# Patient Record
Sex: FEMALE | Race: WHITE | NOT HISPANIC OR LATINO | Employment: UNEMPLOYED | URBAN - NONMETROPOLITAN AREA
[De-identification: names, ages, dates, MRNs, and addresses within clinical notes are randomized per-mention and may not be internally consistent; named-entity substitution may affect disease eponyms.]

---

## 2023-02-04 PROBLEM — S99.929A FOOT INJURY: Status: ACTIVE | Noted: 2023-02-04

## 2023-02-04 PROBLEM — M19.90 ARTHRITIS: Status: ACTIVE | Noted: 2023-02-04

## 2023-02-04 PROBLEM — R05.9 COUGH: Status: ACTIVE | Noted: 2023-02-04

## 2023-02-04 PROBLEM — L21.9 SEBORRHEIC DERMATITIS: Status: ACTIVE | Noted: 2023-02-04

## 2023-02-04 PROBLEM — E66.812 CLASS 2 OBESITY WITHOUT SERIOUS COMORBIDITY WITH BODY MASS INDEX (BMI) OF 36.0 TO 36.9 IN ADULT: Status: ACTIVE | Noted: 2023-02-04

## 2023-02-04 PROBLEM — R53.83 FATIGUE: Status: ACTIVE | Noted: 2023-02-04

## 2023-02-04 PROBLEM — M54.41 ACUTE RIGHT-SIDED LOW BACK PAIN WITH RIGHT-SIDED SCIATICA: Status: ACTIVE | Noted: 2023-02-04

## 2023-02-04 PROBLEM — N92.1 MENORRHAGIA WITH IRREGULAR CYCLE: Status: ACTIVE | Noted: 2023-02-04

## 2023-02-04 PROBLEM — M54.50 LOW BACK PAIN: Status: ACTIVE | Noted: 2023-02-04

## 2023-02-04 PROBLEM — K64.9 HEMORRHOIDS: Status: ACTIVE | Noted: 2023-02-04

## 2023-02-04 PROBLEM — E66.9 CLASS 2 OBESITY WITHOUT SERIOUS COMORBIDITY WITH BODY MASS INDEX (BMI) OF 36.0 TO 36.9 IN ADULT: Status: ACTIVE | Noted: 2023-02-04

## 2023-02-04 PROBLEM — M25.539 WRIST PAIN, ACUTE: Status: ACTIVE | Noted: 2023-02-04

## 2023-02-04 PROBLEM — R63.2 POLYPHAGIA: Status: ACTIVE | Noted: 2023-02-04

## 2023-02-04 PROBLEM — M54.31 SCIATICA OF RIGHT SIDE: Status: ACTIVE | Noted: 2023-02-04

## 2023-02-04 PROBLEM — R42 DIZZINESS: Status: ACTIVE | Noted: 2023-02-04

## 2023-02-04 PROBLEM — K21.9 CHRONIC GERD: Status: ACTIVE | Noted: 2023-02-04

## 2023-02-04 PROBLEM — R19.7 DIARRHEA: Status: ACTIVE | Noted: 2023-02-04

## 2023-02-04 PROBLEM — R51.9 FREQUENT HEADACHES: Status: ACTIVE | Noted: 2023-02-04

## 2023-02-04 PROBLEM — M65.4 DE QUERVAIN'S TENOSYNOVITIS, BILATERAL: Status: ACTIVE | Noted: 2023-02-04

## 2023-02-04 PROBLEM — M79.641 PAIN OF RIGHT HAND: Status: ACTIVE | Noted: 2023-02-04

## 2023-02-04 PROBLEM — F41.8 ANXIETY WITH DEPRESSION: Status: ACTIVE | Noted: 2023-02-04

## 2023-02-04 PROBLEM — R79.89 LOW VITAMIN D LEVEL: Status: ACTIVE | Noted: 2023-02-04

## 2023-02-04 RX ORDER — SERTRALINE HYDROCHLORIDE 100 MG/1
1 TABLET, FILM COATED ORAL DAILY
COMMUNITY
Start: 2020-10-29 | End: 2023-10-17 | Stop reason: ALTCHOICE

## 2023-02-04 RX ORDER — OMEPRAZOLE 40 MG/1
1 CAPSULE, DELAYED RELEASE ORAL DAILY
COMMUNITY
Start: 2022-09-01 | End: 2023-06-19 | Stop reason: ALTCHOICE

## 2023-02-04 RX ORDER — PRENATAL VIT 49/IRON FUM/FOLIC 6.75-0.2MG
TABLET ORAL
COMMUNITY
End: 2023-06-19 | Stop reason: ALTCHOICE

## 2023-03-23 ENCOUNTER — APPOINTMENT (OUTPATIENT)
Dept: PRIMARY CARE | Facility: CLINIC | Age: 33
End: 2023-03-23
Payer: COMMERCIAL

## 2023-04-04 ENCOUNTER — APPOINTMENT (OUTPATIENT)
Dept: PRIMARY CARE | Facility: CLINIC | Age: 33
End: 2023-04-04
Payer: COMMERCIAL

## 2023-06-19 ENCOUNTER — OFFICE VISIT (OUTPATIENT)
Dept: PRIMARY CARE | Facility: CLINIC | Age: 33
End: 2023-06-19
Payer: COMMERCIAL

## 2023-06-19 VITALS
HEIGHT: 60 IN | HEART RATE: 72 BPM | BODY MASS INDEX: 42.8 KG/M2 | WEIGHT: 218 LBS | SYSTOLIC BLOOD PRESSURE: 124 MMHG | DIASTOLIC BLOOD PRESSURE: 78 MMHG

## 2023-06-19 DIAGNOSIS — M25.532 LEFT WRIST PAIN: Primary | ICD-10-CM

## 2023-06-19 PROCEDURE — 1036F TOBACCO NON-USER: CPT | Performed by: FAMILY MEDICINE

## 2023-06-19 PROCEDURE — 99213 OFFICE O/P EST LOW 20 MIN: CPT | Performed by: FAMILY MEDICINE

## 2023-06-19 RX ORDER — DROSPIRENONE AND ETHINYL ESTRADIOL 0.03MG-3MG
1 KIT ORAL DAILY
COMMUNITY
End: 2024-04-10

## 2023-06-19 RX ORDER — BUSPIRONE HYDROCHLORIDE 10 MG/1
10 TABLET ORAL 2 TIMES DAILY
COMMUNITY

## 2023-06-19 NOTE — PROGRESS NOTES
Subjective   Ethel Su is a 33 y.o. female who presents for No chief complaint on file..  Here c/o left wrist pain - was hit by a baseball a couple of weeks ago, had a bad bruise and the bruise did go away but it is still very painful.  She is able to move it without difficulty, she did have some swelling, that seems better now. Used ice initially, not so much recently.              Objective   Visit Vitals  /78   Pulse 72      Physical Exam  Vitals reviewed.   Constitutional:       General: She is not in acute distress.  Pulmonary:      Effort: Pulmonary effort is normal. No respiratory distress.   Musculoskeletal:         General: Normal range of motion.      Comments: There is a small swelling in what appears to be the soft tissue near the left wrist/base of thumb, it is mildly tender to palpation, no erythema, no other concerning findings.   Skin:     General: Skin is warm and dry.   Neurological:      General: No focal deficit present.      Mental Status: She is alert. Mental status is at baseline.         Assessment/Plan   Problem List Items Addressed This Visit    None  Visit Diagnoses       Left wrist pain    -  Primary    Relevant Orders    XR wrist left 3+ views               Deinse Cano MD

## 2023-06-19 NOTE — PROGRESS NOTES
Subjective   Patient ID: Ethel Su is a 33 y.o. female who presents for left wrist pain. Hit by baseball almost 2 weeks ago. No pain with range of motion but pain to the touch  HPI     Review of Systems    Objective   There were no vitals taken for this visit.    Physical Exam    Assessment/Plan

## 2023-10-17 ENCOUNTER — OFFICE VISIT (OUTPATIENT)
Dept: PRIMARY CARE | Facility: CLINIC | Age: 33
End: 2023-10-17
Payer: COMMERCIAL

## 2023-10-17 VITALS
BODY MASS INDEX: 43.07 KG/M2 | WEIGHT: 219.4 LBS | SYSTOLIC BLOOD PRESSURE: 108 MMHG | HEIGHT: 60 IN | DIASTOLIC BLOOD PRESSURE: 78 MMHG | HEART RATE: 92 BPM | OXYGEN SATURATION: 98 %

## 2023-10-17 DIAGNOSIS — F41.8 ANXIETY WITH DEPRESSION: ICD-10-CM

## 2023-10-17 DIAGNOSIS — K21.9 CHRONIC GERD: ICD-10-CM

## 2023-10-17 DIAGNOSIS — E55.9 VITAMIN D DEFICIENCY: ICD-10-CM

## 2023-10-17 DIAGNOSIS — G43.809 OTHER MIGRAINE WITHOUT STATUS MIGRAINOSUS, NOT INTRACTABLE: ICD-10-CM

## 2023-10-17 DIAGNOSIS — R51.9 FREQUENT HEADACHES: Primary | ICD-10-CM

## 2023-10-17 DIAGNOSIS — R42 DIZZINESS: ICD-10-CM

## 2023-10-17 DIAGNOSIS — E66.01 CLASS 3 SEVERE OBESITY WITHOUT SERIOUS COMORBIDITY WITH BODY MASS INDEX (BMI) OF 40.0 TO 44.9 IN ADULT, UNSPECIFIED OBESITY TYPE (MULTI): ICD-10-CM

## 2023-10-17 PROBLEM — M65.4 DE QUERVAIN'S TENOSYNOVITIS, BILATERAL: Status: RESOLVED | Noted: 2023-02-04 | Resolved: 2023-10-17

## 2023-10-17 PROBLEM — L21.9 SEBORRHEIC DERMATITIS: Status: RESOLVED | Noted: 2023-02-04 | Resolved: 2023-10-17

## 2023-10-17 PROBLEM — M25.539 WRIST PAIN, ACUTE: Status: RESOLVED | Noted: 2023-02-04 | Resolved: 2023-10-17

## 2023-10-17 PROBLEM — S99.929A FOOT INJURY: Status: RESOLVED | Noted: 2023-02-04 | Resolved: 2023-10-17

## 2023-10-17 PROBLEM — R19.7 DIARRHEA: Status: RESOLVED | Noted: 2023-02-04 | Resolved: 2023-10-17

## 2023-10-17 PROBLEM — R05.9 COUGH: Status: RESOLVED | Noted: 2023-02-04 | Resolved: 2023-10-17

## 2023-10-17 PROBLEM — M54.31 SCIATICA OF RIGHT SIDE: Status: RESOLVED | Noted: 2023-02-04 | Resolved: 2023-10-17

## 2023-10-17 PROBLEM — M54.41 ACUTE RIGHT-SIDED LOW BACK PAIN WITH RIGHT-SIDED SCIATICA: Status: RESOLVED | Noted: 2023-02-04 | Resolved: 2023-10-17

## 2023-10-17 PROBLEM — R63.2 POLYPHAGIA: Status: RESOLVED | Noted: 2023-02-04 | Resolved: 2023-10-17

## 2023-10-17 PROBLEM — K64.9 HEMORRHOIDS: Status: RESOLVED | Noted: 2023-02-04 | Resolved: 2023-10-17

## 2023-10-17 PROBLEM — M54.50 LOW BACK PAIN: Status: RESOLVED | Noted: 2023-02-04 | Resolved: 2023-10-17

## 2023-10-17 PROBLEM — M79.641 PAIN OF RIGHT HAND: Status: RESOLVED | Noted: 2023-02-04 | Resolved: 2023-10-17

## 2023-10-17 PROBLEM — E66.813 CLASS 3 SEVERE OBESITY WITHOUT SERIOUS COMORBIDITY WITH BODY MASS INDEX (BMI) OF 40.0 TO 44.9 IN ADULT: Status: ACTIVE | Noted: 2023-02-04

## 2023-10-17 PROCEDURE — 3008F BODY MASS INDEX DOCD: CPT | Performed by: FAMILY MEDICINE

## 2023-10-17 PROCEDURE — 1036F TOBACCO NON-USER: CPT | Performed by: FAMILY MEDICINE

## 2023-10-17 PROCEDURE — 99214 OFFICE O/P EST MOD 30 MIN: CPT | Performed by: FAMILY MEDICINE

## 2023-10-17 RX ORDER — SUMATRIPTAN SUCCINATE 100 MG/1
100 TABLET ORAL ONCE AS NEEDED
Qty: 10 TABLET | Refills: 3 | Status: SHIPPED | OUTPATIENT
Start: 2023-10-17 | End: 2023-11-28 | Stop reason: SINTOL

## 2023-10-17 RX ORDER — VENLAFAXINE HYDROCHLORIDE 75 MG/1
75 CAPSULE, EXTENDED RELEASE ORAL EVERY MORNING
COMMUNITY
Start: 2023-09-28

## 2023-10-17 RX ORDER — CALCITRIOL 0.5 UG/1
0.5 CAPSULE ORAL DAILY
COMMUNITY
End: 2024-04-15 | Stop reason: WASHOUT

## 2023-10-17 NOTE — PROGRESS NOTES
Subjective   Patient ID: Ethel Su is a 33 y.o. female who presents for migraine headaches x3 weeks some lasting for  3 days.    HPI     Review of Systems    Objective   There were no vitals taken for this visit.    Physical Exam    Assessment/Plan

## 2023-10-17 NOTE — PROGRESS NOTES
Subjective   Ethel Su is a 33 y.o. female who presents for No chief complaint on file..  Here c/o increased issues with migraine headaches for the past 3 weeks.  She has had some nausea, some dizziness/heart racing when standing - that has been about 6 months.   She is having some issues with her periods for about a year - is seeing GYN for that.  She states that she does have an appointment with her eye doctor as well.      She did switch from sertraline to Effexor in August.              Objective   Visit Vitals  /78 (BP Location: Left arm, Patient Position: Sitting, BP Cuff Size: Adult)   Pulse 92      Physical Exam  Vitals reviewed.   Constitutional:       General: She is not in acute distress.  Cardiovascular:      Rate and Rhythm: Normal rate and regular rhythm.      Heart sounds: No murmur heard.  Pulmonary:      Effort: Pulmonary effort is normal. No respiratory distress.      Breath sounds: Normal breath sounds.   Skin:     General: Skin is warm and dry.   Neurological:      General: No focal deficit present.      Mental Status: She is alert. Mental status is at baseline.         Assessment/Plan   Problem List Items Addressed This Visit       Anxiety with depression    Relevant Orders    CBC and Auto Differential    Comprehensive Metabolic Panel    Lipid Panel    TSH with reflex to Free T4 if abnormal    Vitamin B12    Vitamin D 25-Hydroxy,Total (for eval of Vitamin D levels)    Magnesium    Chronic GERD    Relevant Orders    CBC and Auto Differential    Comprehensive Metabolic Panel    Lipid Panel    TSH with reflex to Free T4 if abnormal    Vitamin B12    Vitamin D 25-Hydroxy,Total (for eval of Vitamin D levels)    Magnesium    Dizziness    Relevant Orders    CBC and Auto Differential    Comprehensive Metabolic Panel    Lipid Panel    TSH with reflex to Free T4 if abnormal    Vitamin B12    Vitamin D 25-Hydroxy,Total (for eval of Vitamin D levels)    Magnesium    Frequent headaches - Primary     Relevant Orders    CBC and Auto Differential    Comprehensive Metabolic Panel    Lipid Panel    TSH with reflex to Free T4 if abnormal    Vitamin B12    Vitamin D 25-Hydroxy,Total (for eval of Vitamin D levels)    Magnesium    Class 3 severe obesity without serious comorbidity with body mass index (BMI) of 40.0 to 44.9 in adult (CMS/[facility])    Relevant Orders    CBC and Auto Differential    Comprehensive Metabolic Panel    Lipid Panel    TSH with reflex to Free T4 if abnormal    Vitamin B12    Vitamin D 25-Hydroxy,Total (for eval of Vitamin D levels)    Magnesium     Other Visit Diagnoses       Vitamin D deficiency        Relevant Orders    Vitamin D 25-Hydroxy,Total (for eval of Vitamin D levels)    Other migraine without status migrainosus, not intractable        Relevant Medications    SUMAtriptan (Imitrex) 100 mg tablet               Denise Cano MD

## 2023-10-19 ENCOUNTER — LAB (OUTPATIENT)
Dept: LAB | Facility: LAB | Age: 33
End: 2023-10-19
Payer: COMMERCIAL

## 2023-10-19 DIAGNOSIS — E55.9 VITAMIN D DEFICIENCY: ICD-10-CM

## 2023-10-19 DIAGNOSIS — R42 DIZZINESS: ICD-10-CM

## 2023-10-19 DIAGNOSIS — K21.9 CHRONIC GERD: ICD-10-CM

## 2023-10-19 DIAGNOSIS — R51.9 FREQUENT HEADACHES: ICD-10-CM

## 2023-10-19 DIAGNOSIS — E66.01 CLASS 3 SEVERE OBESITY WITHOUT SERIOUS COMORBIDITY WITH BODY MASS INDEX (BMI) OF 40.0 TO 44.9 IN ADULT, UNSPECIFIED OBESITY TYPE (MULTI): ICD-10-CM

## 2023-10-19 DIAGNOSIS — F41.8 ANXIETY WITH DEPRESSION: ICD-10-CM

## 2023-10-19 LAB
25(OH)D3 SERPL-MCNC: 25 NG/ML (ref 30–100)
ALBUMIN SERPL BCP-MCNC: 4.3 G/DL (ref 3.4–5)
ALP SERPL-CCNC: 79 U/L (ref 33–110)
ALT SERPL W P-5'-P-CCNC: 19 U/L (ref 7–45)
ANION GAP SERPL CALC-SCNC: 14 MMOL/L (ref 10–20)
AST SERPL W P-5'-P-CCNC: 15 U/L (ref 9–39)
BASOPHILS # BLD AUTO: 0.03 X10*3/UL (ref 0–0.1)
BASOPHILS NFR BLD AUTO: 0.4 %
BILIRUB SERPL-MCNC: 0.4 MG/DL (ref 0–1.2)
BUN SERPL-MCNC: 9 MG/DL (ref 6–23)
CALCIUM SERPL-MCNC: 9.4 MG/DL (ref 8.6–10.3)
CHLORIDE SERPL-SCNC: 102 MMOL/L (ref 98–107)
CHOLEST SERPL-MCNC: 216 MG/DL (ref 0–199)
CHOLESTEROL/HDL RATIO: 4
CO2 SERPL-SCNC: 25 MMOL/L (ref 21–32)
CREAT SERPL-MCNC: 0.52 MG/DL (ref 0.5–1.05)
EOSINOPHIL # BLD AUTO: 0.09 X10*3/UL (ref 0–0.7)
EOSINOPHIL NFR BLD AUTO: 1.3 %
ERYTHROCYTE [DISTWIDTH] IN BLOOD BY AUTOMATED COUNT: 13.1 % (ref 11.5–14.5)
GFR SERPL CREATININE-BSD FRML MDRD: >90 ML/MIN/1.73M*2
GLUCOSE SERPL-MCNC: 89 MG/DL (ref 74–99)
HCT VFR BLD AUTO: 42.5 % (ref 36–46)
HDLC SERPL-MCNC: 54 MG/DL
HGB BLD-MCNC: 13.3 G/DL (ref 12–16)
IMM GRANULOCYTES # BLD AUTO: 0.02 X10*3/UL (ref 0–0.7)
IMM GRANULOCYTES NFR BLD AUTO: 0.3 % (ref 0–0.9)
LDLC SERPL CALC-MCNC: 123 MG/DL
LYMPHOCYTES # BLD AUTO: 2.3 X10*3/UL (ref 1.2–4.8)
LYMPHOCYTES NFR BLD AUTO: 33.9 %
MAGNESIUM SERPL-MCNC: 1.98 MG/DL (ref 1.6–2.4)
MCH RBC QN AUTO: 27.5 PG (ref 26–34)
MCHC RBC AUTO-ENTMCNC: 31.3 G/DL (ref 32–36)
MCV RBC AUTO: 88 FL (ref 80–100)
MONOCYTES # BLD AUTO: 0.29 X10*3/UL (ref 0.1–1)
MONOCYTES NFR BLD AUTO: 4.3 %
NEUTROPHILS # BLD AUTO: 4.05 X10*3/UL (ref 1.2–7.7)
NEUTROPHILS NFR BLD AUTO: 59.8 %
NON HDL CHOLESTEROL: 162 MG/DL (ref 0–149)
NRBC BLD-RTO: 0 /100 WBCS (ref 0–0)
PLATELET # BLD AUTO: 407 X10*3/UL (ref 150–450)
PMV BLD AUTO: 9.9 FL (ref 7.5–11.5)
POTASSIUM SERPL-SCNC: 4.3 MMOL/L (ref 3.5–5.3)
PROT SERPL-MCNC: 7.6 G/DL (ref 6.4–8.2)
RBC # BLD AUTO: 4.84 X10*6/UL (ref 4–5.2)
SODIUM SERPL-SCNC: 137 MMOL/L (ref 136–145)
TRIGL SERPL-MCNC: 195 MG/DL (ref 0–149)
TSH SERPL-ACNC: 1.88 MIU/L (ref 0.44–3.98)
VIT B12 SERPL-MCNC: 163 PG/ML (ref 211–911)
VLDL: 39 MG/DL (ref 0–40)
WBC # BLD AUTO: 6.8 X10*3/UL (ref 4.4–11.3)

## 2023-10-19 PROCEDURE — 80061 LIPID PANEL: CPT

## 2023-10-19 PROCEDURE — 36415 COLL VENOUS BLD VENIPUNCTURE: CPT

## 2023-10-19 PROCEDURE — 83735 ASSAY OF MAGNESIUM: CPT

## 2023-10-19 PROCEDURE — 82607 VITAMIN B-12: CPT

## 2023-10-19 PROCEDURE — 84443 ASSAY THYROID STIM HORMONE: CPT

## 2023-10-19 PROCEDURE — 85025 COMPLETE CBC W/AUTO DIFF WBC: CPT

## 2023-10-19 PROCEDURE — 80053 COMPREHEN METABOLIC PANEL: CPT

## 2023-10-19 PROCEDURE — 82306 VITAMIN D 25 HYDROXY: CPT

## 2023-10-24 ENCOUNTER — OFFICE VISIT (OUTPATIENT)
Dept: OBSTETRICS AND GYNECOLOGY | Facility: CLINIC | Age: 33
End: 2023-10-24
Payer: COMMERCIAL

## 2023-10-24 ENCOUNTER — OFFICE VISIT (OUTPATIENT)
Dept: PRIMARY CARE | Facility: CLINIC | Age: 33
End: 2023-10-24
Payer: COMMERCIAL

## 2023-10-24 VITALS
HEART RATE: 72 BPM | OXYGEN SATURATION: 99 % | HEIGHT: 63 IN | SYSTOLIC BLOOD PRESSURE: 100 MMHG | BODY MASS INDEX: 38.7 KG/M2 | DIASTOLIC BLOOD PRESSURE: 66 MMHG | WEIGHT: 218.4 LBS

## 2023-10-24 VITALS
WEIGHT: 219.6 LBS | HEIGHT: 63 IN | DIASTOLIC BLOOD PRESSURE: 74 MMHG | BODY MASS INDEX: 38.91 KG/M2 | SYSTOLIC BLOOD PRESSURE: 120 MMHG

## 2023-10-24 DIAGNOSIS — R42 DIZZINESS: ICD-10-CM

## 2023-10-24 DIAGNOSIS — Z30.41 ENCOUNTER FOR SURVEILLANCE OF CONTRACEPTIVE PILLS: Primary | ICD-10-CM

## 2023-10-24 DIAGNOSIS — H53.40 VISUAL FIELD DEFECT: Primary | ICD-10-CM

## 2023-10-24 DIAGNOSIS — R51.9 FREQUENT HEADACHES: ICD-10-CM

## 2023-10-24 PROCEDURE — 99213 OFFICE O/P EST LOW 20 MIN: CPT | Performed by: FAMILY MEDICINE

## 2023-10-24 PROCEDURE — 1036F TOBACCO NON-USER: CPT | Performed by: OBSTETRICS & GYNECOLOGY

## 2023-10-24 PROCEDURE — 1036F TOBACCO NON-USER: CPT | Performed by: FAMILY MEDICINE

## 2023-10-24 PROCEDURE — 3008F BODY MASS INDEX DOCD: CPT | Performed by: FAMILY MEDICINE

## 2023-10-24 PROCEDURE — 99213 OFFICE O/P EST LOW 20 MIN: CPT | Performed by: OBSTETRICS & GYNECOLOGY

## 2023-10-24 PROCEDURE — 3008F BODY MASS INDEX DOCD: CPT | Performed by: OBSTETRICS & GYNECOLOGY

## 2023-10-24 RX ORDER — BUTALB/ACETAMINOPHEN/CAFFEINE 50-325-40
1 TABLET ORAL DAILY
COMMUNITY

## 2023-10-24 NOTE — PROGRESS NOTES
Subjective   Patient ID: Ethel Su is a 33 y.o. female who presents for 1 week follow up for labs and also medication recheck. Imitrex  isn't working for her. Seen eye doctor after her appointment last week and the doctor was stating that her vision issue isn't related to her eyes that it is more then likely a medical issue. Patient states that they state to see a Optometrists     HPI     Review of Systems    Objective   LMP 09/26/2023 Comment: Hx of tubal sterilization    Physical Exam    Assessment/Plan

## 2023-10-24 NOTE — PROGRESS NOTES
"Subjective   Patient ID: Ethel Su is a 33 y.o. female who presents for Vaginal Bleeding (Patient is here due to irregular bleeding with her birth control. Patient states she will start bleeding 1 week prior to placebo pills and will bleed for 2 weeks. Patient was started on birth control in May due to irregular periods.).    HPI  Patient was started on birth control pills in May 2023 due to irregular vaginal bleeding.  Patient states that she would have approximately 2 weeks worth of bleeding with each cycle up until September.  For the month of October with the current pill pack, she has had no breakthrough bleeding issues.  Denies any changes in activity or diet.  Denies any changes in medications.  Previous tubal sterilization.    Review of Systems  Review of Systems:   Constitutional: No fever or chills  Respiratory: No shortness of breath, or cough  Cardiovascular: No chest pain or syncope  Breasts: No breast pain, no masses, no nipple discharge  Gastrointestinal: No nausea, vomiting, or diarrhea, no abdominal pain  Genitourinary: No dysuria or frequency  Gynecology: Negative except as noted in history of present illness  All other: All other systems reviewed and negative for complaint    Objective   /74   Ht 1.6 m (5' 3\")   Wt 99.6 kg (219 lb 9.6 oz)   LMP 09/26/2023 Comment: Hx of tubal sterilization  BMI 38.90 kg/m²      Physical Exam  General: No acute distress  Eye: Intraocular movements are intact  HEENT: Normocephalic  Respiratory: Respirations are nonlabored  Gastrointestinal: Nondistended   Musculoskeletal: Normal range of motion  Neurologic: Alert and oriented x3  Psychiatric: Cooperative, appropriate mood and affect.    Assessment/Plan   Problem List Items Addressed This Visit    None  Visit Diagnoses       Encounter for surveillance of contraceptive pills    -  Primary         1.  Contraceptive counseling  Reviewed the episodes of irregular vaginal bleeding since being on the birth " control pills.  She has had no breakthrough bleeding issues on the current pill pack.  Most likely her cycles have now regulated on the birth control pills.  Recommend no changes at this time.  If she has any subsequent bleeding issues she is encouraged to call for further evaluation.  Follow-up in May for her annual exam.

## 2023-10-24 NOTE — PATIENT INSTRUCTIONS
She will get an otc vitamin B12 supplement, resume vitamin D supplement as well.  Will get MRI of the brain and refer to ophthalmology.  Follow up after MRI.

## 2023-10-24 NOTE — PROGRESS NOTES
Subjective   Ethel Su is a 33 y.o. female who presents for No chief complaint on file..  Here for follow up headaches.  She states that the imitrex has not been helpful.  She states that she has been trying to take her medications regularly and that has been somewhat helpful.  She states that she saw the eye doctor last week - has had a visual defect in the right eye.  States that it is pretty much all the time.               Objective   Visit Vitals  /66 (BP Location: Left arm, Patient Position: Sitting, BP Cuff Size: Adult)   Pulse 72      Physical Exam  Vitals reviewed.   Constitutional:       General: She is not in acute distress.  Cardiovascular:      Rate and Rhythm: Normal rate and regular rhythm.      Heart sounds: No murmur heard.  Pulmonary:      Effort: Pulmonary effort is normal. No respiratory distress.      Breath sounds: Normal breath sounds.   Skin:     General: Skin is warm and dry.   Neurological:      General: No focal deficit present.      Mental Status: She is alert. Mental status is at baseline.        Latest Reference Range & Units 10/19/23 10:12   GLUCOSE 74 - 99 mg/dL 89   SODIUM 136 - 145 mmol/L 137   POTASSIUM 3.5 - 5.3 mmol/L 4.3   CHLORIDE 98 - 107 mmol/L 102   Bicarbonate 21 - 32 mmol/L 25   Anion Gap 10 - 20 mmol/L 14   Blood Urea Nitrogen 6 - 23 mg/dL 9   Creatinine 0.50 - 1.05 mg/dL 0.52   EGFR >60 mL/min/1.73m*2 >90   Calcium 8.6 - 10.3 mg/dL 9.4   Albumin 3.4 - 5.0 g/dL 4.3   Alkaline Phosphatase 33 - 110 U/L 79   ALT 7 - 45 U/L 19   AST 9 - 39 U/L 15   Bilirubin Total 0.0 - 1.2 mg/dL 0.4   HDL CHOLESTEROL mg/dL 54.0   Cholesterol/HDL Ratio  4.0   LDL Calculated <=99 mg/dL 123 (H)   VLDL 0 - 40 mg/dL 39   TRIGLYCERIDES 0 - 149 mg/dL 195 (H)   Non HDL Cholesterol 0 - 149 mg/dL 162 (H)   Total Protein 6.4 - 8.2 g/dL 7.6   MAGNESIUM 1.60 - 2.40 mg/dL 1.98   CHOLESTEROL 0 - 199 mg/dL 216 (H)   Vitamin B12 211 - 911 pg/mL 163 (L)   Thyroid Stimulating Hormone 0.44 - 3.98  mIU/L 1.88   Vitamin D, 25-Hydroxy, Total 30 - 100 ng/mL 25 (L)   WBC 4.4 - 11.3 x10*3/uL 6.8   nRBC 0.0 - 0.0 /100 WBCs 0.0   RBC 4.00 - 5.20 x10*6/uL 4.84   HEMOGLOBIN 12.0 - 16.0 g/dL 13.3   HEMATOCRIT 36.0 - 46.0 % 42.5   MCV 80 - 100 fL 88   MCH 26.0 - 34.0 pg 27.5   MCHC 32.0 - 36.0 g/dL 31.3 (L)   RED CELL DISTRIBUTION WIDTH 11.5 - 14.5 % 13.1   Platelets 150 - 450 x10*3/uL 407   MEAN PLATELET VOLUME 7.5 - 11.5 fL 9.9   Neutrophils % 40.0 - 80.0 % 59.8   Immature Granulocytes %, Automated 0.0 - 0.9 % 0.3   Lymphocytes % 13.0 - 44.0 % 33.9   Monocytes % 2.0 - 10.0 % 4.3   Eosinophils % 0.0 - 6.0 % 1.3   Basophils % 0.0 - 2.0 % 0.4   Neutrophils Absolute 1.20 - 7.70 x10*3/uL 4.05   Immature Granulocytes Absolute, Automated 0.00 - 0.70 x10*3/uL 0.02   Lymphocytes Absolute 1.20 - 4.80 x10*3/uL 2.30   Monocytes Absolute 0.10 - 1.00 x10*3/uL 0.29   Eosinophils Absolute 0.00 - 0.70 x10*3/uL 0.09   Basophils Absolute 0.00 - 0.10 x10*3/uL 0.03   (H): Data is abnormally high  (L): Data is abnormally low    Assessment/Plan   Problem List Items Addressed This Visit       Dizziness    Relevant Orders    MR brain w and wo IV contrast    Frequent headaches    Relevant Orders    MR brain w and wo IV contrast     Other Visit Diagnoses       Visual field defect    -  Primary    Relevant Orders    Referral to Ophthalmology    MR brain w and wo IV contrast               Denise Cano MD

## 2023-10-31 ENCOUNTER — HOSPITAL ENCOUNTER (OUTPATIENT)
Dept: RADIOLOGY | Facility: HOSPITAL | Age: 33
Discharge: HOME | End: 2023-10-31
Payer: COMMERCIAL

## 2023-10-31 DIAGNOSIS — R42 DIZZINESS: ICD-10-CM

## 2023-10-31 DIAGNOSIS — R51.9 FREQUENT HEADACHES: ICD-10-CM

## 2023-10-31 DIAGNOSIS — H53.40 VISUAL FIELD DEFECT: ICD-10-CM

## 2023-10-31 PROCEDURE — 70553 MRI BRAIN STEM W/O & W/DYE: CPT | Performed by: RADIOLOGY

## 2023-10-31 PROCEDURE — A9575 INJ GADOTERATE MEGLUMI 0.1ML: HCPCS | Performed by: FAMILY MEDICINE

## 2023-10-31 PROCEDURE — 70553 MRI BRAIN STEM W/O & W/DYE: CPT

## 2023-10-31 PROCEDURE — 2550000001 HC RX 255 CONTRASTS: Performed by: FAMILY MEDICINE

## 2023-10-31 RX ORDER — GADOTERATE MEGLUMINE 376.9 MG/ML
20 INJECTION INTRAVENOUS
Status: COMPLETED | OUTPATIENT
Start: 2023-10-31 | End: 2023-10-31

## 2023-10-31 RX ADMIN — GADOTERATE MEGLUMINE 20 ML: 376.9 INJECTION INTRAVENOUS at 20:26

## 2023-11-07 ENCOUNTER — APPOINTMENT (OUTPATIENT)
Dept: RADIOLOGY | Facility: HOSPITAL | Age: 33
End: 2023-11-07
Payer: COMMERCIAL

## 2023-11-28 ENCOUNTER — OFFICE VISIT (OUTPATIENT)
Dept: PRIMARY CARE | Facility: CLINIC | Age: 33
End: 2023-11-28
Payer: COMMERCIAL

## 2023-11-28 VITALS
SYSTOLIC BLOOD PRESSURE: 128 MMHG | OXYGEN SATURATION: 93 % | WEIGHT: 216.1 LBS | HEIGHT: 60 IN | DIASTOLIC BLOOD PRESSURE: 88 MMHG | BODY MASS INDEX: 42.43 KG/M2 | HEART RATE: 86 BPM

## 2023-11-28 DIAGNOSIS — R51.9 FREQUENT HEADACHES: Primary | ICD-10-CM

## 2023-11-28 PROCEDURE — 1036F TOBACCO NON-USER: CPT | Performed by: FAMILY MEDICINE

## 2023-11-28 PROCEDURE — 3008F BODY MASS INDEX DOCD: CPT | Performed by: FAMILY MEDICINE

## 2023-11-28 PROCEDURE — 99213 OFFICE O/P EST LOW 20 MIN: CPT | Performed by: FAMILY MEDICINE

## 2023-11-28 RX ORDER — METOPROLOL SUCCINATE 25 MG/1
25 TABLET, EXTENDED RELEASE ORAL DAILY
Qty: 30 TABLET | Refills: 5 | Status: SHIPPED | OUTPATIENT
Start: 2023-11-28 | End: 2024-05-26

## 2023-11-28 NOTE — PROGRESS NOTES
Subjective   Patient ID: Ethel Su is a 33 y.o. female who presents for follow up for MRI results.     HPI     Review of Systems    Objective   There were no vitals taken for this visit.    Physical Exam    Assessment/Plan

## 2023-12-16 ENCOUNTER — OFFICE VISIT (OUTPATIENT)
Dept: URGENT CARE | Facility: CLINIC | Age: 33
End: 2023-12-16
Payer: COMMERCIAL

## 2023-12-16 VITALS
BODY MASS INDEX: 42.21 KG/M2 | DIASTOLIC BLOOD PRESSURE: 95 MMHG | RESPIRATION RATE: 16 BRPM | OXYGEN SATURATION: 97 % | HEIGHT: 60 IN | HEART RATE: 77 BPM | WEIGHT: 215 LBS | SYSTOLIC BLOOD PRESSURE: 137 MMHG | TEMPERATURE: 96 F

## 2023-12-16 DIAGNOSIS — J06.9 URI WITH COUGH AND CONGESTION: Primary | ICD-10-CM

## 2023-12-16 PROCEDURE — 99212 OFFICE O/P EST SF 10 MIN: CPT | Mod: 25 | Performed by: PHYSICIAN ASSISTANT

## 2023-12-16 RX ORDER — AMOXICILLIN 500 MG/1
500 CAPSULE ORAL 2 TIMES DAILY
Qty: 14 CAPSULE | Refills: 0 | Status: SHIPPED | OUTPATIENT
Start: 2023-12-16 | End: 2023-12-23

## 2023-12-16 RX ORDER — AZELASTINE 1 MG/ML
2 SPRAY, METERED NASAL 2 TIMES DAILY
Qty: 30 ML | Refills: 0 | Status: SHIPPED | OUTPATIENT
Start: 2023-12-16 | End: 2024-04-17 | Stop reason: WASHOUT

## 2023-12-16 RX ORDER — BROMPHENIRAMINE MALEATE, PSEUDOEPHEDRINE HYDROCHLORIDE, AND DEXTROMETHORPHAN HYDROBROMIDE 2; 30; 10 MG/5ML; MG/5ML; MG/5ML
5 SYRUP ORAL 4 TIMES DAILY PRN
Qty: 120 ML | Refills: 0 | Status: SHIPPED | OUTPATIENT
Start: 2023-12-16 | End: 2023-12-26

## 2023-12-16 ASSESSMENT — VISUAL ACUITY: OU: 1

## 2023-12-16 NOTE — PROGRESS NOTES
[facility] URGENT CARE ALEM NOTE:      Name: Ethel Su, 33 y.o.    CSN:[id number]   MRN:[id number]    PCP: Denise Cano MD    ALL:  No Known Allergies    History:    Chief Complaint: URI (COUGH, LOSS OF VOICE, CONGESTION, HEADACHE, FATIGUED X 1 WEEK)    Encounter Date: 2023  12:23hrs    HPI: The history was obtained from the patient. Ethel is a 33 y.o. female, who presents with a chief complaint of URI (COUGH, LOSS OF VOICE, CONGESTION, HEADACHE, FATIGUED X 1 WEEK)    Has been exhibiting symptoms as described above for a week, she has been taking DayQuil and NyQuil with modest improvement, denies any overall resolution of symptoms, she mentions her children have also exhibited similar symptoms but seem to be improving.  She denies any exertional dyspnea, denies any nausea, vomiting or diarrhea.    PMHx:    Past Medical History:   Diagnosis Date    Anxiety     Arthritis     COVID-19 2021    COVID    Depression     Encounter for  delivery without indication     Delivery of pregnancy by  section    Encounter for routine postpartum follow-up 2022    Postpartum exam    Encounter for routine postpartum follow-up 2021    Postpartum care following  delivery    Gestational diabetes mellitus in pregnancy, diet controlled 2020    Diet controlled gestational diabetes mellitus (GDM) in third trimester    Migraine, unspecified, not intractable, without status migrainosus 2018    Migraine    Other conditions influencing health status     Menstruation    Other specified pregnancy related conditions, unspecified trimester 2022    Rh negative, antepartum    Pain in right wrist 2021    Right wrist pain              Current Outpatient Medications   Medication Sig Dispense Refill    busPIRone (Buspar) 10 mg tablet Take 1 tablet (10 mg) by mouth 2 times a day.      calcium citrate-vitamin D3 (Citracal+D) 315 mg-5 mcg (200 unit) tablet Take  1 tablet by mouth once daily.      drospirenone-ethinyl estradioL (Eboni, Ocella) 3-0.03 mg tablet Take 1 tablet by mouth once daily. as directed      metoprolol succinate XL (Toprol-XL) 25 mg 24 hr tablet Take 1 tablet (25 mg) by mouth once daily. Do not crush or chew. 30 tablet 5    multivit-min/ferrous fumarate (MULTI VITAMIN ORAL) Take 1 capsule by mouth once daily.      venlafaxine XR (Effexor-XR) 75 mg 24 hr capsule Take 1 capsule (75 mg) by mouth once daily in the morning.      amoxicillin (Amoxil) 500 mg capsule Take 1 capsule (500 mg) by mouth 2 times a day for 7 days. 14 capsule 0    azelastine (Astelin) 137 mcg (0.1 %) nasal spray Administer 2 sprays into each nostril 2 times a day for 15 days. Use in each nostril as directed 30 mL 0    brompheniramine-pseudoeph-DM 2-30-10 mg/5 mL syrup Take 5 mL by mouth 4 times a day as needed for allergies for up to 10 days. 120 mL 0    calcitriol (Rocaltrol) 0.5 mcg capsule Take 1 capsule (0.5 mcg) by mouth once daily.       No current facility-administered medications for this visit.         PMSx:    Past Surgical History:   Procedure Laterality Date     SECTION, LOW TRANSVERSE  2020    CHOLECYSTECTOMY  2018    TONSILLECTOMY      TUBAL LIGATION  2022       Fam Hx:   Family History   Problem Relation Name Age of Onset    COPD Mother Roxann     Hyperlipidemia Father Ethan     Other (CVA) Maternal Grandmother Dortha     Diabetes Maternal Grandmother Dortha     Hypertension Maternal Grandmother Dortha     Stroke Maternal Grandmother Dortha     Other (non-hodgkins lymphoma) Paternal Grandmother Bubba     Cancer Paternal Grandmother Bubba     Heart attack Paternal Grandfather Ottoniel     Alcohol abuse Paternal Grandfather Ottoniel        SOC. Hx:     Social History     Socioeconomic History    Marital status:      Spouse name: Not on file    Number of children: Not on file    Years of education: Not on file    Highest education level: Not on file    Occupational History    Not on file   Tobacco Use    Smoking status: Never    Smokeless tobacco: Never   Vaping Use    Vaping Use: Never used   Substance and Sexual Activity    Alcohol use: Never    Drug use: Never    Sexual activity: Yes     Partners: Male     Birth control/protection: Female Sterilization   Other Topics Concern    Not on file   Social History Narrative    Not on file     Social Determinants of Health     Financial Resource Strain: Not on file   Food Insecurity: Not on file   Transportation Needs: Not on file   Physical Activity: Not on file   Stress: Not on file   Social Connections: Not on file   Intimate Partner Violence: Not on file   Housing Stability: Not on file         Vitals:    12/16/23 1205   BP: (!) 137/95   Pulse: 77   Resp: 16   Temp: 35.6 °C (96 °F)   SpO2: 97%     97.5 kg (215 lb)          Physical Exam  Constitutional:       Appearance: Normal appearance. She is normal weight.   HENT:      Head: Normocephalic and atraumatic.      Nose: Congestion and rhinorrhea present.      Right Turbinates: Enlarged and swollen.      Left Turbinates: Enlarged and swollen.      Right Sinus: Maxillary sinus tenderness and frontal sinus tenderness present.      Left Sinus: Maxillary sinus tenderness and frontal sinus tenderness present.      Mouth/Throat:      Lips: Pink.      Mouth: Mucous membranes are moist.      Comments: Noted posterior nasal drainage  Eyes:      General: Lids are normal. Vision grossly intact.      Extraocular Movements: Extraocular movements intact.      Comments: Wearing glasses   Cardiovascular:      Rate and Rhythm: Normal rate and regular rhythm.   Pulmonary:      Effort: Pulmonary effort is normal.      Breath sounds: Normal breath sounds.   Musculoskeletal:         General: Normal range of motion.      Cervical back: Full passive range of motion without pain, normal range of motion and neck supple.   Skin:     General: Skin is warm.      Findings: No rash (On exposed  area).   Neurological:      Mental Status: She is alert and oriented to person, place, and time.   Psychiatric:         Behavior: Behavior normal.          COURSE/MEDICAL DECISION MAKING:    Ethel is a 33 y.o., who presents with a working diagnosis of   1. URI with cough and congestion     with a differential to include: Influenza, parainfluenza, rhinovirus, adenovirus, metapneumovirus, coronavirus, COVID-19, postnasal drip, strep pharyngitis, GERD, retropharyngeal abscess, tonsillitis, adenitis, seasonal allergies      Supportive care recommended, patient agrees, if symptoms persist greater than 14 days she has amoxicillin refill.  She was agreeable with this.  Meds were sent to the pharmacy of request, she was discharged.        Ruben Alvarado PA-C   Advanced Practice Provider  [facility] URGENT CARE

## 2023-12-16 NOTE — PATIENT INSTRUCTIONS
"PLEASE SCHEDULE A FOLLOW UP APPOINTMENT WITH:    Denise Cano MD    IN 2-3 WEEKS OR RETURN HERE FOR AN EVALUATION.       What is an upper respiratory infection?  An upper respiratory infection (\"URI\") is an illness that can affect your nose, throat, ears, and sinuses. Almost all URIs are caused by a virus. The common cold is an example of a viral URI. Some URIs are caused by bacteria, but this is much less common.    URIs spread easily from person to person, most often through coughing or sneezing. A URI will almost always get better in a week or 2 without any treatment. Because most URIs are caused by viruses, antibiotics do not usually help.    If you do have a bacterial infection, your doctor might prescribe antibiotics.    How do I care for myself at home?  Ask the doctor or nurse what you should do when you go home. Make sure that you understand exactly what you need to do to care for yourself. Ask questions if there is anything you do not understand.    You should also:    ?Wash your hands often (figure 1), and cough or sneeze into a tissue. If you do not have a tissue, cough or sneeze into your elbow instead of your hands.    ?Drink lots of fluids (water, juice, or broth) to stay hydrated, unless your doctor told you otherwise. This will help replace any fluids lost through runny nose or fever. Warm tea or soup can also help soothe a sore throat.    ?To help a stuffy nose and make it easier to breathe:    Use saline nose drops or spray.    Use a humidifier if the air in your home feels dry.    ?Follow the directions on the label carefully if you take over-the-counter cough or cold medicines. Do not take more than 1 medicine that contains acetaminophen. Also, if you have a heart problem or high blood pressure, check with your doctor before you take any of these medicines.    ?Try to quit smoking if you smoke. Your doctor or nurse can help.    How can I prevent getting another URI?  The best way to " prevent a URI, or keep it from spreading to others, is to keep your hands clean. Wash your hands often with soap and water or alcohol gel rubs.    Some other ways to prevent the spread of infection include:    ?Always wash your hands with soap and water after you cough, sneeze, or blow your nose.    ?Clean surfaces and objects that you touch a lot. These include sinks, counters, tables, door handles, remotes, and phones. Use a bleach and water mixture. The germs that cause a URI can live on surfaces for at least 2 hours.    ?Do not share cups, food, towels, bed linens, or other personal items.    ?Stay away from other people when you are sick. When you do need to be around other people, consider wearing a face mask.    When should I call the doctor?  Call for advice if:    ?You have a persistent fever of 100.4°F (38°C) or higher, chills, a very bad sore throat, or ear or sinus pain.    ?You get a new fever after several days of feeling the same or getting better.    ?You start having chest pain when you cough.    ?You have a cough that lasts more than 10 days.    ?You cough up blood.    ?You have any new or worsening symptoms, such as worsening cough or trouble breathing.

## 2023-12-28 ENCOUNTER — OFFICE VISIT (OUTPATIENT)
Dept: PRIMARY CARE | Facility: CLINIC | Age: 33
End: 2023-12-28
Payer: COMMERCIAL

## 2023-12-28 VITALS
OXYGEN SATURATION: 96 % | DIASTOLIC BLOOD PRESSURE: 86 MMHG | HEIGHT: 60 IN | WEIGHT: 218.7 LBS | HEART RATE: 85 BPM | BODY MASS INDEX: 42.94 KG/M2 | SYSTOLIC BLOOD PRESSURE: 116 MMHG

## 2023-12-28 DIAGNOSIS — M62.838 MUSCLE SPASM: ICD-10-CM

## 2023-12-28 DIAGNOSIS — R51.9 FREQUENT HEADACHES: Primary | ICD-10-CM

## 2023-12-28 PROCEDURE — 99214 OFFICE O/P EST MOD 30 MIN: CPT | Performed by: FAMILY MEDICINE

## 2023-12-28 PROCEDURE — 3008F BODY MASS INDEX DOCD: CPT | Performed by: FAMILY MEDICINE

## 2023-12-28 PROCEDURE — 1036F TOBACCO NON-USER: CPT | Performed by: FAMILY MEDICINE

## 2023-12-28 RX ORDER — BACLOFEN 10 MG/1
10 TABLET ORAL 3 TIMES DAILY PRN
Qty: 30 TABLET | Refills: 1 | Status: SHIPPED | OUTPATIENT
Start: 2023-12-28 | End: 2024-06-25

## 2023-12-28 NOTE — PROGRESS NOTES
Subjective   Ethel Su is a 33 y.o. female who presents for No chief complaint on file..  Here for follow up migraine/headaches, visual field deficits.  We started metoprolol at her last visit and she is tolerating that - she feels like her headaches are less frequent, severity is about the same.  She has an appointment with neurology next month.  She does mention she has a stiff neck - woke up with it aching about a week ago.  It is not getting better.  She is taking tylenol/ibuprofen.              Objective   Visit Vitals  /86 (BP Location: Left arm, Patient Position: Sitting, BP Cuff Size: Adult)   Pulse 85      Physical Exam  Vitals reviewed.   Constitutional:       General: She is not in acute distress.  Cardiovascular:      Rate and Rhythm: Normal rate and regular rhythm.      Heart sounds: No murmur heard.  Pulmonary:      Effort: Pulmonary effort is normal. No respiratory distress.      Breath sounds: Normal breath sounds.   Skin:     General: Skin is warm and dry.   Neurological:      General: No focal deficit present.      Mental Status: She is alert. Mental status is at baseline.         Assessment/Plan   Problem List Items Addressed This Visit       Frequent headaches - Primary    Relevant Orders    Follow Up In Primary Care - Established     Other Visit Diagnoses       Muscle spasm        Relevant Medications    baclofen (Lioresal) 10 mg tablet               Denise Cano MD   
Subjective   Patient ID: Ethel Su is a 33 y.o. female who presents for medication check up. Patient states their headaches has been less frequent and not as severe.  Also patient states they thought she slept wrong and had a stiff neck on the left side . It's been a week and they are still having pain    HPI     Review of Systems    Objective   LMP 11/22/2023     Physical Exam    Assessment/Plan          
none

## 2023-12-28 NOTE — PATIENT INSTRUCTIONS
Will try muscle relaxer for the neck - cautioned about potential drowsiness.  Continue other medication, follow up with specialist as scheduled.  Follow up here in 3-4 months.

## 2024-01-29 ENCOUNTER — OFFICE VISIT (OUTPATIENT)
Dept: PRIMARY CARE | Facility: CLINIC | Age: 34
End: 2024-01-29
Payer: COMMERCIAL

## 2024-01-29 VITALS
DIASTOLIC BLOOD PRESSURE: 76 MMHG | OXYGEN SATURATION: 99 % | BODY MASS INDEX: 42.35 KG/M2 | HEART RATE: 96 BPM | HEIGHT: 60 IN | WEIGHT: 215.7 LBS | SYSTOLIC BLOOD PRESSURE: 122 MMHG

## 2024-01-29 DIAGNOSIS — R22.31 MASS OF FINGER OF RIGHT HAND: Primary | ICD-10-CM

## 2024-01-29 PROCEDURE — 3008F BODY MASS INDEX DOCD: CPT | Performed by: FAMILY MEDICINE

## 2024-01-29 PROCEDURE — 1036F TOBACCO NON-USER: CPT | Performed by: FAMILY MEDICINE

## 2024-01-29 PROCEDURE — 99213 OFFICE O/P EST LOW 20 MIN: CPT | Performed by: FAMILY MEDICINE

## 2024-01-29 NOTE — PROGRESS NOTES
Subjective   Ethel Su is a 33 y.o. female who presents for No chief complaint on file..  Here c/o lump on her finger.  Noticed it a few weeks ago.  It is mildly uncomfortable, not particularly painful unless something pushes on it.              Objective   Visit Vitals  /76 (BP Location: Left arm, Patient Position: Sitting, BP Cuff Size: Adult)   Pulse 96      Physical Exam  Vitals reviewed.   Constitutional:       General: She is not in acute distress.  Cardiovascular:      Rate and Rhythm: Normal rate.      Heart sounds: No murmur heard.  Pulmonary:      Effort: Pulmonary effort is normal. No respiratory distress.   Musculoskeletal:      Comments: There is a small nodule in the right hand, palmar surface, near the MCP joint of the right middle finger.  No skin changes.   Skin:     General: Skin is warm and dry.   Neurological:      General: No focal deficit present.      Mental Status: She is alert. Mental status is at baseline.         Assessment/Plan   Problem List Items Addressed This Visit    None  Visit Diagnoses       Mass of finger of right hand    -  Primary    Relevant Orders    XR hand right 1-2 views               Denise Cano MD

## 2024-01-29 NOTE — PROGRESS NOTES
Subjective   Patient ID: Ethel Su is a 33 y.o. female who presents for Right hand has a lump. Over 3 weeks     HPI     Review of Systems    Objective   There were no vitals taken for this visit.    Physical Exam    Assessment/Plan

## 2024-04-10 DIAGNOSIS — Z30.41 ENCOUNTER FOR SURVEILLANCE OF CONTRACEPTIVE PILLS: Primary | ICD-10-CM

## 2024-04-10 RX ORDER — DROSPIRENONE AND ETHINYL ESTRADIOL 0.03MG-3MG
1 KIT ORAL DAILY
Qty: 84 TABLET | Refills: 3 | Status: SHIPPED | OUTPATIENT
Start: 2024-04-10 | End: 2024-04-17 | Stop reason: SDUPTHER

## 2024-04-15 ENCOUNTER — OFFICE VISIT (OUTPATIENT)
Dept: PRIMARY CARE | Facility: CLINIC | Age: 34
End: 2024-04-15
Payer: COMMERCIAL

## 2024-04-15 VITALS
BODY MASS INDEX: 41.72 KG/M2 | WEIGHT: 212.5 LBS | OXYGEN SATURATION: 99 % | DIASTOLIC BLOOD PRESSURE: 86 MMHG | HEART RATE: 70 BPM | SYSTOLIC BLOOD PRESSURE: 122 MMHG | HEIGHT: 60 IN

## 2024-04-15 DIAGNOSIS — L30.9 ECZEMA, UNSPECIFIED TYPE: Primary | ICD-10-CM

## 2024-04-15 DIAGNOSIS — R51.9 FREQUENT HEADACHES: ICD-10-CM

## 2024-04-15 PROCEDURE — 3008F BODY MASS INDEX DOCD: CPT | Performed by: FAMILY MEDICINE

## 2024-04-15 PROCEDURE — 99213 OFFICE O/P EST LOW 20 MIN: CPT | Performed by: FAMILY MEDICINE

## 2024-04-15 PROCEDURE — 1036F TOBACCO NON-USER: CPT | Performed by: FAMILY MEDICINE

## 2024-04-15 RX ORDER — HYDROCORTISONE 25 MG/G
CREAM TOPICAL 2 TIMES DAILY PRN
Qty: 30 G | Refills: 2 | Status: SHIPPED | OUTPATIENT
Start: 2024-04-15 | End: 2025-04-15

## 2024-04-15 NOTE — PROGRESS NOTES
"Subjective   Ethel Su is a 34 y.o. female who presents for No chief complaint on file..  Here for routine follow up migraine/headache, anxiety/depression.  She is seeing neurology at [facility] for her headaches and states that things are a little better.  She is having some issues with \"dry skin\" in her ears.  She does have a bump in the left ear for about a week.  It is tender to touch.              Objective   Visit Vitals  /86 (BP Location: Left arm, Patient Position: Sitting, BP Cuff Size: Adult)   Pulse 70      Physical Exam  Vitals reviewed.   Constitutional:       General: She is not in acute distress.  HENT:      Ears:      Comments: There is some eczema of the left ear with mild irritation, no significant erythema or evidence of infection  Cardiovascular:      Rate and Rhythm: Normal rate and regular rhythm.      Heart sounds: No murmur heard.  Pulmonary:      Effort: Pulmonary effort is normal. No respiratory distress.      Breath sounds: Normal breath sounds.   Skin:     General: Skin is warm and dry.   Neurological:      General: No focal deficit present.      Mental Status: She is alert. Mental status is at baseline.         Assessment/Plan   Problem List Items Addressed This Visit       Frequent headaches    Eczema - Primary    Relevant Medications    hydrocortisone 2.5 % cream          Denise Cano MD   "

## 2024-04-15 NOTE — PATIENT INSTRUCTIONS
Will treat the eczema with steroid cream prn.  Continue current medications.  Follow up with specialists as scheduled.  Follow up in 6 months, sooner if needed.

## 2024-04-15 NOTE — PROGRESS NOTES
Subjective   Patient ID: Ethel Su is a 34 y.o. female who presents for follow up. Patient also states she has been having itching in bilateral ears and she would like you to look at them.   HPI     Review of Systems    Objective   There were no vitals taken for this visit.    Physical Exam    Assessment/Plan

## 2024-04-17 ENCOUNTER — TELEPHONE (OUTPATIENT)
Dept: OBSTETRICS AND GYNECOLOGY | Facility: CLINIC | Age: 34
End: 2024-04-17
Payer: COMMERCIAL

## 2024-04-17 DIAGNOSIS — Z30.41 ENCOUNTER FOR SURVEILLANCE OF CONTRACEPTIVE PILLS: ICD-10-CM

## 2024-04-17 RX ORDER — DROSPIRENONE AND ETHINYL ESTRADIOL 0.03MG-3MG
1 KIT ORAL DAILY
Qty: 84 TABLET | Refills: 0 | Status: SHIPPED | OUTPATIENT
Start: 2024-04-17 | End: 2024-06-10 | Stop reason: WASHOUT

## 2024-04-17 NOTE — TELEPHONE ENCOUNTER
Patient called in, Her yearly is due after May 16th she is scheduled for June 3rd she is asking for 2 refills to get her through to her yearly.

## 2024-06-03 ENCOUNTER — APPOINTMENT (OUTPATIENT)
Dept: OBSTETRICS AND GYNECOLOGY | Facility: CLINIC | Age: 34
End: 2024-06-03
Payer: COMMERCIAL

## 2024-06-10 ENCOUNTER — OFFICE VISIT (OUTPATIENT)
Dept: URGENT CARE | Facility: CLINIC | Age: 34
End: 2024-06-10
Payer: COMMERCIAL

## 2024-06-10 VITALS
DIASTOLIC BLOOD PRESSURE: 85 MMHG | BODY MASS INDEX: 42.21 KG/M2 | OXYGEN SATURATION: 98 % | TEMPERATURE: 98.2 F | HEART RATE: 79 BPM | WEIGHT: 215 LBS | HEIGHT: 60 IN | SYSTOLIC BLOOD PRESSURE: 121 MMHG | RESPIRATION RATE: 16 BRPM

## 2024-06-10 DIAGNOSIS — J45.30 MILD PERSISTENT REACTIVE AIRWAY DISEASE WITHOUT COMPLICATION (HHS-HCC): Primary | ICD-10-CM

## 2024-06-10 DIAGNOSIS — J01.90 ACUTE RHINOSINUSITIS: ICD-10-CM

## 2024-06-10 PROCEDURE — 99212 OFFICE O/P EST SF 10 MIN: CPT | Performed by: PHYSICIAN ASSISTANT

## 2024-06-10 RX ORDER — AZITHROMYCIN 250 MG/1
TABLET, FILM COATED ORAL
Qty: 6 TABLET | Refills: 0 | Status: SHIPPED | OUTPATIENT
Start: 2024-06-10 | End: 2024-06-15

## 2024-06-10 RX ORDER — DEXTROMETHORPHAN HYDROBROMIDE, GUAIFENESIN AND PSEUDOEPHEDRINE HYDROCHLORIDE 15; 400; 60 MG/1; MG/1; MG/1
1 TABLET ORAL 3 TIMES DAILY
Qty: 21 TABLET | Refills: 0 | Status: SHIPPED | OUTPATIENT
Start: 2024-06-10 | End: 2024-06-17

## 2024-06-10 RX ORDER — PREDNISONE 10 MG/1
30 TABLET ORAL DAILY
Qty: 21 TABLET | Refills: 0 | Status: SHIPPED | OUTPATIENT
Start: 2024-06-10 | End: 2024-06-17

## 2024-06-10 RX ORDER — ALBUTEROL SULFATE 90 UG/1
2 AEROSOL, METERED RESPIRATORY (INHALATION) EVERY 6 HOURS PRN
Qty: 18 G | Refills: 0 | Status: SHIPPED | OUTPATIENT
Start: 2024-06-10 | End: 2025-06-10

## 2024-06-10 NOTE — PROGRESS NOTES
[facility] URGENT CARE ALEM NOTE:      Name: Ethel Su, 34 y.o.    CSN:[id number]   MRN:[id number]    PCP: Denise Cano MD    ALL:  No Known Allergies    History:    Chief Complaint: Cough (Started with cold symptoms x 2 weeks ago but cough is worsening )    Encounter Date: 6/10/2024  1135    HPI: The history was obtained from the patient. Ethel is a 34 y.o. female, who presents with a chief complaint of Cough (Started with cold symptoms x 2 weeks ago but cough is worsening ) Patient had a cold symptoms onset 2 weeks ago with nasal congestion, sore throat, cough.     She was not seen for this illness. Symptoms improved after 1 week but she reports a lingering productive cough with wheezing that is worse at night.     She has tried Nyquil for sleep. She denies SOB, chest pain, fever, history of asthma. She has no known sick contacts. She does report a history of bronchitis and pneumonia.     PMHx:    Past Medical History:   Diagnosis Date    Anxiety     Arthritis     COVID-19 2021    COVID    Depression     Encounter for  delivery without indication ([facility])     Delivery of pregnancy by  section    Encounter for routine postpartum follow-up ([facility]) 2022    Postpartum exam    Encounter for routine postpartum follow-up ([facility]) 2021    Postpartum care following  delivery    Gestational diabetes mellitus in pregnancy, diet controlled ([facility]) 2020    Diet controlled gestational diabetes mellitus (GDM) in third trimester    Migraine, unspecified, not intractable, without status migrainosus 2018    Migraine    Other conditions influencing health status     Menstruation    Other specified pregnancy related conditions, unspecified trimester ([facility]) 2022    Rh negative, antepartum    Pain in right wrist 2021    Right wrist pain              Current Outpatient Medications   Medication Sig Dispense Refill    baclofen (Lioresal)  10 mg tablet Take 1 tablet (10 mg) by mouth 3 times a day as needed for muscle spasms. 30 tablet 1    busPIRone (Buspar) 10 mg tablet Take 1 tablet (10 mg) by mouth 2 times a day.      calcium citrate-vitamin D3 (Citracal+D) 315 mg-5 mcg (200 unit) tablet Take 1 tablet by mouth once daily.      hydrocortisone 2.5 % cream Apply topically 2 times a day as needed for irritation or rash. 30 g 2    multivit-min/ferrous fumarate (MULTI VITAMIN ORAL) Take 1 capsule by mouth once daily.      venlafaxine XR (Effexor-XR) 75 mg 24 hr capsule Take 1 capsule (75 mg) by mouth once daily in the morning.      albuterol 90 mcg/actuation inhaler Inhale 2 puffs every 6 hours if needed for wheezing. 18 g 0    azithromycin (Zithromax) 250 mg tablet Take 2 tablets (500 mg) on  Day 1,  followed by 1 tablet (250 mg) once daily on Days 2 through 5. 6 tablet 0    metoprolol succinate XL (Toprol-XL) 25 mg 24 hr tablet Take 1 tablet (25 mg) by mouth once daily. Do not crush or chew. 30 tablet 5    predniSONE (Deltasone) 10 mg tablet Take 3 tablets (30 mg) by mouth once daily for 7 days. 21 tablet 0    pseudoephedrine-DM-guaifenesin (Capmist DM) 60- mg tablet Take 1 tablet by mouth 3 times a day for 7 days. 21 tablet 0     No current facility-administered medications for this visit.         PMSx:    Past Surgical History:   Procedure Laterality Date     SECTION, LOW TRANSVERSE  2020    CHOLECYSTECTOMY  2018    TONSILLECTOMY      TUBAL LIGATION  2022       Fam Hx:   Family History   Problem Relation Name Age of Onset    COPD Mother Roxann     Hyperlipidemia Father Ethan     Other (CVA) Maternal Grandmother Dortha     Diabetes Maternal Grandmother Dortha     Hypertension Maternal Grandmother Dortha     Stroke Maternal Grandmother Dortha     Other (non-hodgkins lymphoma) Paternal Grandmother Bubba     Cancer Paternal Grandmother Bubba     Heart attack Paternal Grandfather Ottoniel     Alcohol abuse Paternal Grandfather Ottoniel         SOC. Hx:     Social History     Socioeconomic History    Marital status:      Spouse name: Not on file    Number of children: Not on file    Years of education: Not on file    Highest education level: Not on file   Occupational History    Not on file   Tobacco Use    Smoking status: Never    Smokeless tobacco: Never   Vaping Use    Vaping status: Never Used   Substance and Sexual Activity    Alcohol use: Never    Drug use: Never    Sexual activity: Yes     Partners: Male     Birth control/protection: Female Sterilization   Other Topics Concern    Not on file   Social History Narrative    Not on file     Social Determinants of Health     Financial Resource Strain: Not on file   Food Insecurity: Not on file   Transportation Needs: Not on file   Physical Activity: Not on file   Stress: Not on file   Social Connections: Not on file   Intimate Partner Violence: Not on file   Housing Stability: Not on file         Vitals:    06/10/24 1123   BP: 121/85   Pulse: 79   Resp: 16   Temp: 36.8 °C (98.2 °F)   SpO2: 98%     97.5 kg (215 lb)          Physical Exam  Constitutional:       General: She is not in acute distress.     Appearance: Normal appearance.   HENT:      Head: Normocephalic and atraumatic.      Comments: Muffled voice     Right Ear: Tympanic membrane normal.      Left Ear: Tympanic membrane normal.      Nose: Nose normal.      Mouth/Throat:      Mouth: Mucous membranes are moist.      Dentition: No gum lesions.      Palate: No lesions.      Pharynx: Oropharynx is clear. Uvula midline. No pharyngeal swelling, oropharyngeal exudate or posterior oropharyngeal erythema.   Eyes:      Conjunctiva/sclera: Conjunctivae normal.   Cardiovascular:      Rate and Rhythm: Normal rate and regular rhythm.      Heart sounds: Normal heart sounds.   Pulmonary:      Effort: Pulmonary effort is normal. No tachypnea, accessory muscle usage, prolonged expiration or respiratory distress.      Breath sounds: Wheezing and  rhonchi present.   Abdominal:      General: Abdomen is flat. Bowel sounds are normal.      Palpations: Abdomen is soft.   Musculoskeletal:      Cervical back: Normal range of motion.   Lymphadenopathy:      Cervical: No cervical adenopathy.   Skin:     General: Skin is warm and dry.      Capillary Refill: Capillary refill takes less than 2 seconds.   Neurological:      Mental Status: She is alert and oriented to person, place, and time.   Psychiatric:         Mood and Affect: Mood normal.         Behavior: Behavior normal.         ____________________________________________________________________    I did personally review Ethel's past medical history, surgical history, social history, as well as family history (when relevant).  In this case, I also oversaw the her drug management by reviewing her medication list, allergy list, as well as the medications that I prescribed during the UC course and/or recommended as an out-patient (including possible OTC medications such as acetaminophen, NSAIDs , etc).    After reviewing the items above, I did look at previous medical documentation, such as recent hospitalizations, office visits, and/or recent consultations with PCP/specialist.                          SDOH:   Another factor that I considered in Ethel's care was her Social Determinants of Health (SDOH). During this UC encounter, she did not have social determinants of health. Those SDOH influencing Ethel's care are: none      UC COURSE/MEDICAL DECISION MAKING:    Ethel is a 34 y.o., who presents with a working diagnosis of   1. Mild persistent reactive airway disease without complication ([facility])    2. Acute rhinosinusitis     with a differential to include: Influenza, parainfluenza, rhinovirus, adenovirus, metapneumovirus, coronavirus, COVID-19, postnasal drip, strep pharyngitis, GERD, retropharyngeal abscess, tonsillitis, adenitis, seasonal allergies    Suspect reactive airway disease given the onset of  symptoms, discussed treatment plan at bedside to include use of inhalers antitussive antibiotic and if symptoms persist to return or seek reevaluation.  Patient was agreeable to plan of care she was discharged stable condition.    Note initiated by:  STACIE Meyers-Student, [facility]     Supervised by  Ruben Alvarado PA-C   Advanced Practice Provider  [facility] URGENT CARE    I was present with the PA student who participated in the documentation of this note. I have personally seen and re-examined the patient and performed the medical decision-making components (assessment and plan of care). I have reviewed the PA student documentation and verified the findings in the note as written with additions or exceptions as stated in the body of this note.    Ruben Alvarado PA-C

## 2024-06-26 ENCOUNTER — HOSPITAL ENCOUNTER (OUTPATIENT)
Dept: RADIOLOGY | Facility: HOSPITAL | Age: 34
Discharge: HOME | End: 2024-06-26
Payer: COMMERCIAL

## 2024-06-26 ENCOUNTER — OFFICE VISIT (OUTPATIENT)
Dept: URGENT CARE | Facility: CLINIC | Age: 34
End: 2024-06-26
Payer: COMMERCIAL

## 2024-06-26 VITALS
TEMPERATURE: 97.6 F | BODY MASS INDEX: 42.2 KG/M2 | OXYGEN SATURATION: 98 % | RESPIRATION RATE: 16 BRPM | HEIGHT: 60 IN | HEART RATE: 88 BPM | DIASTOLIC BLOOD PRESSURE: 99 MMHG | WEIGHT: 214.95 LBS | SYSTOLIC BLOOD PRESSURE: 154 MMHG

## 2024-06-26 DIAGNOSIS — J45.30 MILD PERSISTENT REACTIVE AIRWAY DISEASE WITHOUT COMPLICATION (HHS-HCC): Primary | ICD-10-CM

## 2024-06-26 DIAGNOSIS — J45.30 MILD PERSISTENT REACTIVE AIRWAY DISEASE WITHOUT COMPLICATION (HHS-HCC): ICD-10-CM

## 2024-06-26 PROCEDURE — 99213 OFFICE O/P EST LOW 20 MIN: CPT | Performed by: PHYSICIAN ASSISTANT

## 2024-06-26 PROCEDURE — 71046 X-RAY EXAM CHEST 2 VIEWS: CPT | Performed by: RADIOLOGY

## 2024-06-26 PROCEDURE — 71046 X-RAY EXAM CHEST 2 VIEWS: CPT

## 2024-06-26 RX ORDER — FLUTICASONE PROPIONATE AND SALMETEROL 250; 50 UG/1; UG/1
1 POWDER RESPIRATORY (INHALATION) 2 TIMES DAILY
Qty: 60 EACH | Refills: 0 | Status: SHIPPED | OUTPATIENT
Start: 2024-06-26 | End: 2024-07-26

## 2024-06-26 RX ORDER — MONTELUKAST SODIUM 10 MG/1
10 TABLET ORAL DAILY
Qty: 30 TABLET | Refills: 0 | Status: SHIPPED | OUTPATIENT
Start: 2024-06-26 | End: 2024-07-26

## 2024-06-26 NOTE — PROGRESS NOTES
[facility] URGENT CARE ALEM NOTE:      Name: Ethel Su, 34 y.o.    CSN:[id number]   MRN:[id number]    PCP: Denise Cano MD    ALL:  No Known Allergies    History:    Chief Complaint: Cough (Pt states bronchitis, pt states rib pain after coughing on the right side. )    Encounter Date: 2024  1023    HPI: The history was obtained from the patient. Ethel is a 34 y.o. female, who presents with a chief complaint of Cough (Pt states bronchitis, pt states rib pain after coughing on the right side. ) Has had 4 weeks of cough. Completed z-pack but stopped CapMist due to itchy sensation. Denies fever, chills, dyspnea, accessory muscle use. Has pleuritic chest pain with a feeling of having something needing to come up with cough. Has on and off sore throat with rhinorrhea.     Has 2 cats at home with carpet in living room and bedroom. Has tried Robitussin, inhaler, and cough drops with minimal relief. Cough worsens when outside or when doing any sort of activity.    PMHx:    Past Medical History:   Diagnosis Date    Anxiety     Arthritis     COVID-19 2021    COVID    Depression     Encounter for  delivery without indication ([facility])     Delivery of pregnancy by  section    Encounter for routine postpartum follow-up ([facility]) 2022    Postpartum exam    Encounter for routine postpartum follow-up ([facility]) 2021    Postpartum care following  delivery    Gestational diabetes mellitus in pregnancy, diet controlled ([facility]) 2020    Diet controlled gestational diabetes mellitus (GDM) in third trimester    Migraine, unspecified, not intractable, without status migrainosus 2018    Migraine    Other conditions influencing health status     Menstruation    Other specified pregnancy related conditions, unspecified trimester ([facility]) 2022    Rh negative, antepartum    Pain in right wrist 2021    Right wrist pain              Current  Outpatient Medications   Medication Sig Dispense Refill    albuterol 90 mcg/actuation inhaler Inhale 2 puffs every 6 hours if needed for wheezing. 18 g 0    baclofen (Lioresal) 10 mg tablet Take 1 tablet (10 mg) by mouth 3 times a day as needed for muscle spasms. 30 tablet 1    busPIRone (Buspar) 10 mg tablet Take 1 tablet (10 mg) by mouth 2 times a day.      calcium citrate-vitamin D3 (Citracal+D) 315 mg-5 mcg (200 unit) tablet Take 1 tablet by mouth once daily.      fluticasone propion-salmeteroL (Advair Diskus) 250-50 mcg/dose diskus inhaler Inhale 1 puff 2 times a day. Rinse mouth with water after use to reduce aftertaste and incidence of candidiasis. Do not swallow. 60 each 0    hydrocortisone 2.5 % cream Apply topically 2 times a day as needed for irritation or rash. 30 g 2    metoprolol succinate XL (Toprol-XL) 25 mg 24 hr tablet Take 1 tablet (25 mg) by mouth once daily. Do not crush or chew. 30 tablet 5    montelukast (Singulair) 10 mg tablet Take 1 tablet (10 mg) by mouth once daily. 30 tablet 0    multivit-min/ferrous fumarate (MULTI VITAMIN ORAL) Take 1 capsule by mouth once daily.      venlafaxine XR (Effexor-XR) 75 mg 24 hr capsule Take 1 capsule (75 mg) by mouth once daily in the morning.       No current facility-administered medications for this visit.         PMSx:    Past Surgical History:   Procedure Laterality Date     SECTION, LOW TRANSVERSE  2020    CHOLECYSTECTOMY  2018    TONSILLECTOMY      TUBAL LIGATION  2022       Fam Hx:   Family History   Problem Relation Name Age of Onset    COPD Mother Roxann     Hyperlipidemia Father Ethan     Other (CVA) Maternal Grandmother Dortha     Diabetes Maternal Grandmother Dortha     Hypertension Maternal Grandmother Dortha     Stroke Maternal Grandmother Dortha     Other (non-hodgkins lymphoma) Paternal Grandmother Bubba     Cancer Paternal Grandmother Bubba     Heart attack Paternal Grandfather Ottoniel     Alcohol abuse Paternal Grandfather  Ottoniel        SOC. Hx:     Social History     Socioeconomic History    Marital status:      Spouse name: Not on file    Number of children: Not on file    Years of education: Not on file    Highest education level: Not on file   Occupational History    Not on file   Tobacco Use    Smoking status: Never    Smokeless tobacco: Never   Vaping Use    Vaping status: Never Used   Substance and Sexual Activity    Alcohol use: Never    Drug use: Never    Sexual activity: Yes     Partners: Male     Birth control/protection: Female Sterilization   Other Topics Concern    Not on file   Social History Narrative    Not on file     Social Determinants of Health     Financial Resource Strain: Not on file   Food Insecurity: Not on file   Transportation Needs: Not on file   Physical Activity: Not on file   Stress: Not on file   Social Connections: Not on file   Intimate Partner Violence: Not on file   Housing Stability: Not on file         Vitals:    06/26/24 1007   BP: (!) 154/99   Pulse: 88   Resp: 16   Temp: 36.4 °C (97.6 °F)   SpO2: 98%     97.5 kg (214 lb 15.2 oz)          Physical Exam  Vitals and nursing note reviewed.   Constitutional:       General: She is not in acute distress.     Appearance: Normal appearance. She is not ill-appearing.   HENT:      Head: Normocephalic and atraumatic.      Nose: No congestion or rhinorrhea.      Mouth/Throat:      Mouth: Mucous membranes are moist.      Pharynx: No posterior oropharyngeal erythema.   Eyes:      Extraocular Movements: Extraocular movements intact.      Pupils: Pupils are equal, round, and reactive to light.   Cardiovascular:      Rate and Rhythm: Normal rate.   Pulmonary:      Effort: Pulmonary effort is normal.      Breath sounds: Normal breath sounds.   Musculoskeletal:         General: Normal range of motion.   Skin:     General: Skin is warm.   Neurological:      Mental Status: She is alert and oriented to person, place, and time.   Psychiatric:         Mood and  Affect: Mood normal.           LABORATORY @ RADIOLOGICAL IMAGING (if done):     FINDINGS:  PA and lateral radiographs of the chest were provided.          CARDIOMEDIASTINAL SILHOUETTE:  Cardiomediastinal silhouette is normal in size and configuration.      LUNGS:  Lungs are clear.      ABDOMEN:  No remarkable upper abdominal findings.      BONES:  No acute osseous changes.      IMPRESSION:  1.  No evidence of acute cardiopulmonary process.              MACRO:  None      Signed by: Alberto Chew 6/26/2024 11:10 AM  Dictation workstation:   [id number]    ____________________________________________________________________    I did personally review Ethel's past medical history, surgical history, social history, as well as family history (when relevant).  In this case, I also oversaw the her drug management by reviewing her medication list, allergy list, as well as the medications that I prescribed during the UC course and/or recommended as an out-patient (including possible OTC medications such as acetaminophen, NSAIDs , etc).    After reviewing the items above, I did look at previous medical documentation, such as recent hospitalizations, office visits, and/or recent consultations with PCP/specialist.                          SDOH:   Another factor that I considered in Ethel's care was her Social Determinants of Health (SDOH). During this UC encounter, she did not have social determinants of health. Those SDOH influencing Ethel's care are: none      UC COURSE/MEDICAL DECISION MAKING:    Ethel is a 34 y.o., who presents with a working diagnosis of   1. Mild persistent reactive airway disease without complication ([facility])     with a differential to include:   - Post nasal Drip: on and off rhinorrhea with mucus-like productive cough. Does not state that it is worse when laying down.  - Pneumonia: Dyspnea on flat surfaces and productive cough. Clear lung sounds.  - Reactive airway vs Asthma: Has eczema and is  exposed to carpet and cats. Cough is worse outside and with light activity. Albuterol helps cough.     Rx'd Singulair and Advair. Pt instructed to wash mouth after inhaler. Chest Xray ordered.    Note initiated by:  STACIE Garzon-Student, [facility]    Supervised by  Ruben Alvarado PA-C   Advanced Practice Provider  [facility] URGENT CARE    I was present with the PA student who participated in the documentation of this note. I have personally seen and re-examined the patient and performed the medical decision-making components (assessment and plan of care). I have reviewed the PA student documentation and verified the findings in the note as written with additions or exceptions as stated in the body of this note.    Ruben Alvarado PA-C

## 2024-07-05 ENCOUNTER — HOSPITAL ENCOUNTER (EMERGENCY)
Facility: HOSPITAL | Age: 34
Discharge: HOME | End: 2024-07-05
Payer: COMMERCIAL

## 2024-07-05 ENCOUNTER — APPOINTMENT (OUTPATIENT)
Dept: RADIOLOGY | Facility: HOSPITAL | Age: 34
End: 2024-07-05
Payer: COMMERCIAL

## 2024-07-05 ENCOUNTER — HOSPITAL ENCOUNTER (OUTPATIENT)
Dept: CARDIOLOGY | Facility: HOSPITAL | Age: 34
Discharge: HOME | End: 2024-07-05
Payer: COMMERCIAL

## 2024-07-05 VITALS
SYSTOLIC BLOOD PRESSURE: 127 MMHG | TEMPERATURE: 99.3 F | BODY MASS INDEX: 42.21 KG/M2 | DIASTOLIC BLOOD PRESSURE: 68 MMHG | RESPIRATION RATE: 16 BRPM | HEART RATE: 75 BPM | WEIGHT: 215 LBS | HEIGHT: 60 IN | OXYGEN SATURATION: 97 %

## 2024-07-05 DIAGNOSIS — J40 BRONCHITIS: Primary | ICD-10-CM

## 2024-07-05 DIAGNOSIS — R09.1 PLEURISY: ICD-10-CM

## 2024-07-05 LAB
ALBUMIN SERPL BCP-MCNC: 4.3 G/DL (ref 3.4–5)
ALP SERPL-CCNC: 83 U/L (ref 33–110)
ALT SERPL W P-5'-P-CCNC: 35 U/L (ref 7–45)
ANION GAP SERPL CALC-SCNC: 14 MMOL/L (ref 10–20)
AST SERPL W P-5'-P-CCNC: 26 U/L (ref 9–39)
BILIRUB SERPL-MCNC: 0.3 MG/DL (ref 0–1.2)
BUN SERPL-MCNC: 8 MG/DL (ref 6–23)
CALCIUM SERPL-MCNC: 9 MG/DL (ref 8.6–10.3)
CHLORIDE SERPL-SCNC: 102 MMOL/L (ref 98–107)
CO2 SERPL-SCNC: 24 MMOL/L (ref 21–32)
CREAT SERPL-MCNC: 0.61 MG/DL (ref 0.5–1.05)
D DIMER PPP FEU-MCNC: 240 NG/ML FEU
EGFRCR SERPLBLD CKD-EPI 2021: >90 ML/MIN/1.73M*2
ERYTHROCYTE [DISTWIDTH] IN BLOOD BY AUTOMATED COUNT: 13.3 % (ref 11.5–14.5)
FLUAV RNA RESP QL NAA+PROBE: NOT DETECTED
FLUBV RNA RESP QL NAA+PROBE: NOT DETECTED
GLUCOSE SERPL-MCNC: 89 MG/DL (ref 74–99)
HCT VFR BLD AUTO: 38.1 % (ref 36–46)
HGB BLD-MCNC: 12.2 G/DL (ref 12–16)
MCH RBC QN AUTO: 27.7 PG (ref 26–34)
MCHC RBC AUTO-ENTMCNC: 32 G/DL (ref 32–36)
MCV RBC AUTO: 87 FL (ref 80–100)
NRBC BLD-RTO: 0 /100 WBCS (ref 0–0)
PLATELET # BLD AUTO: 309 X10*3/UL (ref 150–450)
POTASSIUM SERPL-SCNC: 3.8 MMOL/L (ref 3.5–5.3)
PROT SERPL-MCNC: 7 G/DL (ref 6.4–8.2)
RBC # BLD AUTO: 4.4 X10*6/UL (ref 4–5.2)
SARS-COV-2 RNA RESP QL NAA+PROBE: NOT DETECTED
SODIUM SERPL-SCNC: 136 MMOL/L (ref 136–145)
WBC # BLD AUTO: 5 X10*3/UL (ref 4.4–11.3)

## 2024-07-05 PROCEDURE — 71046 X-RAY EXAM CHEST 2 VIEWS: CPT

## 2024-07-05 PROCEDURE — 84075 ASSAY ALKALINE PHOSPHATASE: CPT | Performed by: PHYSICIAN ASSISTANT

## 2024-07-05 PROCEDURE — 85379 FIBRIN DEGRADATION QUANT: CPT | Performed by: PHYSICIAN ASSISTANT

## 2024-07-05 PROCEDURE — 2500000004 HC RX 250 GENERAL PHARMACY W/ HCPCS (ALT 636 FOR OP/ED): Performed by: PHYSICIAN ASSISTANT

## 2024-07-05 PROCEDURE — 93005 ELECTROCARDIOGRAM TRACING: CPT

## 2024-07-05 PROCEDURE — 36415 COLL VENOUS BLD VENIPUNCTURE: CPT | Performed by: PHYSICIAN ASSISTANT

## 2024-07-05 PROCEDURE — 85027 COMPLETE CBC AUTOMATED: CPT | Performed by: PHYSICIAN ASSISTANT

## 2024-07-05 PROCEDURE — 87636 SARSCOV2 & INF A&B AMP PRB: CPT | Performed by: PHYSICIAN ASSISTANT

## 2024-07-05 PROCEDURE — 99283 EMERGENCY DEPT VISIT LOW MDM: CPT | Mod: 25

## 2024-07-05 PROCEDURE — 96360 HYDRATION IV INFUSION INIT: CPT

## 2024-07-05 PROCEDURE — 71046 X-RAY EXAM CHEST 2 VIEWS: CPT | Mod: FOREIGN READ | Performed by: RADIOLOGY

## 2024-07-05 ASSESSMENT — COLUMBIA-SUICIDE SEVERITY RATING SCALE - C-SSRS
1. IN THE PAST MONTH, HAVE YOU WISHED YOU WERE DEAD OR WISHED YOU COULD GO TO SLEEP AND NOT WAKE UP?: NO
6. HAVE YOU EVER DONE ANYTHING, STARTED TO DO ANYTHING, OR PREPARED TO DO ANYTHING TO END YOUR LIFE?: NO
2. HAVE YOU ACTUALLY HAD ANY THOUGHTS OF KILLING YOURSELF?: NO

## 2024-07-05 ASSESSMENT — PAIN SCALES - GENERAL
PAINLEVEL_OUTOF10: 0 - NO PAIN
PAINLEVEL_OUTOF10: 1

## 2024-07-05 ASSESSMENT — PAIN - FUNCTIONAL ASSESSMENT
PAIN_FUNCTIONAL_ASSESSMENT: 0-10
PAIN_FUNCTIONAL_ASSESSMENT: 0-10

## 2024-07-05 ASSESSMENT — PAIN DESCRIPTION - PAIN TYPE: TYPE: ACUTE PAIN

## 2024-07-05 NOTE — ED PROVIDER NOTES
HPI   Chief Complaint   Patient presents with    Flu Symptoms     Pt. Reports diagnosed with Bronchitis middle of . Completed Z-j carlos and steroids. Now with Increased cough, sore, and pleuritic type pain when breathing       Patient presents with ongoing cough and congestion for about a month now.  States that the cough is somewhat improved but now she is having right-sided chest discomfort after coughing.  States she felt a pop in the right side of her lower right ribs and has had discomfort since.  States cough is nonproductive.  States she had a temperature of 101 prior.  States that she saw her family doctor and was placed on a Z-J Carlos, steroids, and an inhaler.  She did not feel any improvement after these.  She denies any travel or surgery recently.  She is not on oral birth control.  She denies any trouble breathing.  She states she has no history of lung disease such as asthma or COPD.  She does have a history of pneumonia.      History provided by:  Patient                      Pace Coma Scale Score: 15                     Patient History   Past Medical History:   Diagnosis Date    Anxiety     Arthritis     COVID-19 2021    COVID    Depression     Encounter for  delivery without indication ([facility])     Delivery of pregnancy by  section    Encounter for routine postpartum follow-up ([facility]) 2022    Postpartum exam    Encounter for routine postpartum follow-up ([facility]) 2021    Postpartum care following  delivery    Gestational diabetes mellitus in pregnancy, diet controlled ([facility]) 2020    Diet controlled gestational diabetes mellitus (GDM) in third trimester    Migraine, unspecified, not intractable, without status migrainosus 2018    Migraine    Other conditions influencing health status     Menstruation    Other specified pregnancy related conditions, unspecified trimester ([facility]) 2022    Rh negative, antepartum    Pain in right wrist  2021    Right wrist pain     Past Surgical History:   Procedure Laterality Date     SECTION, LOW TRANSVERSE  2020    CHOLECYSTECTOMY  2018    TONSILLECTOMY      TUBAL LIGATION  2022     Family History   Problem Relation Name Age of Onset    COPD Mother Roxann     Hyperlipidemia Father Ethan     Other (CVA) Maternal Grandmother Dortha     Diabetes Maternal Grandmother Dortha     Hypertension Maternal Grandmother Dortha     Stroke Maternal Grandmother Dortha     Other (non-hodgkins lymphoma) Paternal Grandmother Bubba     Cancer Paternal Grandmother Bubba     Heart attack Paternal Grandfather Ottoniel     Alcohol abuse Paternal Grandfather Ottoniel      Social History     Tobacco Use    Smoking status: Never    Smokeless tobacco: Never   Vaping Use    Vaping status: Never Used   Substance Use Topics    Alcohol use: Never    Drug use: Never       Physical Exam   ED Triage Vitals [24 1750]   Temperature Heart Rate Respirations BP   37.8 °C (100.1 °F) 90 18 167/84      Pulse Ox Temp Source Heart Rate Source Patient Position   98 % Oral Monitor --      BP Location FiO2 (%)     -- --       Physical Exam  Vitals and nursing note reviewed.   Constitutional:       General: She is not in acute distress.     Appearance: Normal appearance. She is well-developed, well-groomed and normal weight. She is not ill-appearing or toxic-appearing.   HENT:      Head: Normocephalic.      Right Ear: External ear normal.      Left Ear: External ear normal.      Nose: Nose normal.      Mouth/Throat:      Lips: Pink. No lesions.      Mouth: Mucous membranes are moist.   Eyes:      General: No scleral icterus.     Conjunctiva/sclera: Conjunctivae normal.      Pupils: Pupils are equal, round, and reactive to light.   Cardiovascular:      Rate and Rhythm: Normal rate and regular rhythm.      Heart sounds: Normal heart sounds.   Pulmonary:      Effort: Pulmonary effort is normal.      Breath sounds: Normal breath sounds and  air entry.   Chest:      Chest wall: No tenderness.   Abdominal:      General: Bowel sounds are normal. There is no distension.      Palpations: Abdomen is soft.      Tenderness: There is no abdominal tenderness. There is no right CVA tenderness, left CVA tenderness or guarding.   Musculoskeletal:      Right lower leg: No edema.      Left lower leg: No edema.   Skin:     General: Skin is warm.      Capillary Refill: Capillary refill takes less than 2 seconds.   Neurological:      General: No focal deficit present.      Mental Status: She is alert and oriented to person, place, and time.      Cranial Nerves: No cranial nerve deficit or facial asymmetry.      Sensory: No sensory deficit.      Motor: No weakness.      Gait: Gait normal.   Psychiatric:         Attention and Perception: Attention and perception normal.         Mood and Affect: Mood and affect normal.         Speech: Speech normal.         Behavior: Behavior normal. Behavior is cooperative.         Thought Content: Thought content normal.         Cognition and Memory: Cognition and memory normal.         Judgment: Judgment normal.         ED Course & MDM   Diagnoses as of 07/05/24 1927   Bronchitis   Pleurisy       Medical Decision Making  Patient presents with ongoing cough and congestion for about a month now.  States that the cough is somewhat improved but now she is having right-sided chest discomfort after coughing.  States she felt a pop in the right side of her lower right ribs and has had discomfort since.  States cough is nonproductive.  States she had a temperature of 101 prior.  States that she saw her family doctor and was placed on a Z-J Carlos, steroids, and an inhaler.  She did not feel any improvement after these.  She denies any travel or surgery recently.  She is not on oral birth control.  She denies any trouble breathing.  She states she has no history of lung disease such as asthma or COPD.  She does have a history of pneumonia.    Ddx:  PE, cardiac pulmonary, pleurisy, pneumonia, COVID, influenza, viral, other    Will obtain basic workup.  No acute findings were noted including D-dimer  Chest x-ray read by radiologist showing no acute infiltrate or pneumothorax  Patient given IV fluids  Patient will be discharged home in improved and stable condition to continue follow-up with family care provider.    Amount and/or Complexity of Data Reviewed  Labs: ordered. Decision-making details documented in ED Course.  Radiology: ordered and independent interpretation performed. Decision-making details documented in ED Course.  ECG/medicine tests: ordered and independent interpretation performed. Decision-making details documented in ED Course.     Details: Read by myself showing normal sinus rhythm at a ventricular rate of 86 bpm.  Normal axis.  No ST segment elevation.  OR interval 108 with a QT of 356        Procedure  Procedures     Mony Webb PA-C  07/05/24 1927

## 2024-07-10 ENCOUNTER — OFFICE VISIT (OUTPATIENT)
Dept: PRIMARY CARE | Facility: CLINIC | Age: 34
End: 2024-07-10
Payer: COMMERCIAL

## 2024-07-10 VITALS
HEART RATE: 70 BPM | BODY MASS INDEX: 42.15 KG/M2 | WEIGHT: 215.8 LBS | SYSTOLIC BLOOD PRESSURE: 106 MMHG | DIASTOLIC BLOOD PRESSURE: 60 MMHG | OXYGEN SATURATION: 97 %

## 2024-07-10 DIAGNOSIS — B34.9 VIRAL SYNDROME: Primary | ICD-10-CM

## 2024-07-10 PROCEDURE — 99213 OFFICE O/P EST LOW 20 MIN: CPT | Performed by: FAMILY MEDICINE

## 2024-07-10 PROCEDURE — 1036F TOBACCO NON-USER: CPT | Performed by: FAMILY MEDICINE

## 2024-07-10 PROCEDURE — 3008F BODY MASS INDEX DOCD: CPT | Performed by: FAMILY MEDICINE

## 2024-07-10 NOTE — PATIENT INSTRUCTIONS
Suspect she has a recent viral syndrome as she is still recovering from the bronchitis last month.  As she is improving will continue to monitor at this time.  Follow up if she worsens or does not continue to improve.

## 2024-07-10 NOTE — PROGRESS NOTES
Subjective   Ethel Su is a 34 y.o. female who presents for Follow-up (ER visit-bronchitis, back pain, chest pain,fever).  Here for follow up recent ER visit for bronchitis.  She had been to urgent care twice in the past month with bronchitis, treated with zpak and steroids and did not improve.  She developed fever, sore throat, pain in her ribs last week and she went to ER and they told her she had pleurisy.  She then developed a rash on her hands and feet - it is pretty much gone now.  She states that her cough is a little better.  She has been taking tylenol/ibuprofen and that seems to be helpful.  Her fever is better now as well.              Objective   Visit Vitals  /60   Pulse 70      Physical Exam  Vitals reviewed.   Constitutional:       General: She is not in acute distress.  Cardiovascular:      Rate and Rhythm: Normal rate and regular rhythm.      Heart sounds: No murmur heard.  Pulmonary:      Effort: Pulmonary effort is normal. No respiratory distress.      Breath sounds: Normal breath sounds.   Skin:     General: Skin is warm and dry.      Comments: There is mild erythematous macular rash on the hands/fingers   Neurological:      General: No focal deficit present.      Mental Status: She is alert. Mental status is at baseline.         Assessment/Plan   Problem List Items Addressed This Visit    None  Visit Diagnoses       Viral syndrome    -  Primary               Denise Cano MD

## 2024-07-13 LAB
ATRIAL RATE: 86 BPM
P AXIS: 39 DEGREES
P OFFSET: 204 MS
P ONSET: 169 MS
PR INTERVAL: 108 MS
Q ONSET: 223 MS
QRS COUNT: 14 BEATS
QRS DURATION: 66 MS
QT INTERVAL: 356 MS
QTC CALCULATION(BAZETT): 426 MS
QTC FREDERICIA: 401 MS
R AXIS: 66 DEGREES
T AXIS: 34 DEGREES
T OFFSET: 401 MS
VENTRICULAR RATE: 86 BPM

## 2024-07-23 ENCOUNTER — APPOINTMENT (OUTPATIENT)
Dept: OBSTETRICS AND GYNECOLOGY | Facility: CLINIC | Age: 34
End: 2024-07-23
Payer: COMMERCIAL

## 2024-07-23 VITALS
WEIGHT: 218.2 LBS | BODY MASS INDEX: 42.84 KG/M2 | SYSTOLIC BLOOD PRESSURE: 114 MMHG | DIASTOLIC BLOOD PRESSURE: 64 MMHG | HEIGHT: 60 IN

## 2024-07-23 DIAGNOSIS — Z12.4 ENCOUNTER FOR SCREENING FOR CERVICAL CANCER: ICD-10-CM

## 2024-07-23 DIAGNOSIS — Z01.419 ENCOUNTER FOR GYNECOLOGICAL EXAMINATION WITHOUT ABNORMAL FINDING: ICD-10-CM

## 2024-07-23 PROCEDURE — 1036F TOBACCO NON-USER: CPT | Performed by: OBSTETRICS & GYNECOLOGY

## 2024-07-23 PROCEDURE — 88175 CYTOPATH C/V AUTO FLUID REDO: CPT

## 2024-07-23 PROCEDURE — 99395 PREV VISIT EST AGE 18-39: CPT | Performed by: OBSTETRICS & GYNECOLOGY

## 2024-07-23 PROCEDURE — 3008F BODY MASS INDEX DOCD: CPT | Performed by: OBSTETRICS & GYNECOLOGY

## 2024-07-23 ASSESSMENT — PATIENT HEALTH QUESTIONNAIRE - PHQ9
1. LITTLE INTEREST OR PLEASURE IN DOING THINGS: NOT AT ALL
SUM OF ALL RESPONSES TO PHQ9 QUESTIONS 1 AND 2: 0
2. FEELING DOWN, DEPRESSED OR HOPELESS: NOT AT ALL

## 2024-07-23 ASSESSMENT — PAIN SCALES - GENERAL: PAINLEVEL: 0-NO PAIN

## 2024-07-23 NOTE — PROGRESS NOTES
Ethel Su is a 34 y.o. female who is here for a routine exam. PCP = Denise Cano MD    Chief Complaint   Patient presents with    Gynecologic Exam     Patient is here for yearly exam and pap test. Patient does not do regular self breast exams and has no concerns at this time. LMP:24.          Presents for annual exam. She voices no complaints and is doing well. Denies any bowel or bladder problems. Denies any breast problems. She had previous tubal sterilization. She is no longer on the birth control pills. Denies any menstrual problems.    OB History          2    Para   2    Term   2       0    AB   0    Living   2         SAB   0    IAB   0    Ectopic   0    Multiple   0    Live Births   2                  Social History     Substance and Sexual Activity   Sexual Activity Yes    Partners: Male    Birth control/protection: Female Sterilization     Current contraception: Tubal Sterilization    Past Medical History:   Diagnosis Date    Anxiety     Arthritis     COVID-19 2021    COVID    Depression     Encounter for  delivery without indication ([facility])     Delivery of pregnancy by  section    Encounter for routine postpartum follow-up ([facility]) 2022    Postpartum exam    Encounter for routine postpartum follow-up ([facility]) 2021    Postpartum care following  delivery    Gestational diabetes mellitus in pregnancy, diet controlled ([facility]) 2020    Diet controlled gestational diabetes mellitus (GDM) in third trimester    Migraine, unspecified, not intractable, without status migrainosus 2018    Migraine    Other conditions influencing health status     Menstruation    Other specified pregnancy related conditions, unspecified trimester ([facility]) 2022    Rh negative, antepartum    Pain in right wrist 2021    Right wrist pain       Past Surgical History:   Procedure Laterality Date     SECTION, LOW TRANSVERSE   06/18/2020    CHOLECYSTECTOMY  2018    TONSILLECTOMY      TUBAL LIGATION  01/20/2022       Past med hx and past surg hx reviewed and notable for: none    Review of Systems:   Constitutional: No fever or chills  Respiratory: No shortness of breath, or cough  Cardiovascular: No chest pain or syncope  Breasts: No breast pain, no masses, no nipple discharge  Gastrointestinal: No nausea, vomiting, or diarrhea, no abdominal pain  Genitourinary: No dysuria or frequency  Gynecology: Negative except as noted in history of present illness  All other: All other systems reviewed and negative for complaint    Objective   /64   Ht 1.524 m (5')   Wt 99 kg (218 lb 3.2 oz)   LMP 07/05/2024 (Exact Date)   BMI 42.61 kg/m²     PHYSICAL EXAMINATION:  Well-developed, well nourished, in no acute distress, alert and oriented x three, is pleasant and cooperative.   HEENT: Clear. Pupils equal, round and reactive to light and accommodation. Extraocular muscles are intact. Oral mucosa pink without exudate.   NECK: No lymphadenopathy, no thyromegaly.  BREASTS: Symmetric, no palpable masses. No nipple discharge or retraction.  LUNGS: Clear bilaterally.  HEART: Regular rate and rhythm without murmurs.  ABDOMEN: Normoactive bowel sounds, soft and nontender, no guarding or rebound tenderness, no CVA tenderness.  EXTREMITIES: No clubbing, cyanosis or edema.  NEUROLOGIC:  Cranial nerves II-XII grossly intact.  :  Normal external female genitalia, normal vulva, normal vagina. Normal urethral meatus, urethra and bladder. Normal appearing cervix. Normal-sized uterus, no adnexal masses or tenderness. Pap smear performed today.      Actions performed during this visit include:  - Clinical breast exam  - Clinical pelvic exam  - No orders of the defined types were placed in this encounter.       Problem List Items Addressed This Visit    None  Visit Diagnoses       Encounter for gynecological examination without abnormal finding        Relevant  Orders    THINPREP PAP TEST    Encounter for screening for cervical cancer        Relevant Orders    THINPREP PAP TEST             Provider Impression:   Annual    Thank you for coming to your annual exam. Your findings during the exam were normal.  Please return for your next visit in 1 year.

## 2024-07-31 ENCOUNTER — TELEPHONE (OUTPATIENT)
Dept: PRIMARY CARE | Facility: CLINIC | Age: 34
End: 2024-07-31
Payer: COMMERCIAL

## 2024-07-31 DIAGNOSIS — J45.30 MILD PERSISTENT REACTIVE AIRWAY DISEASE WITHOUT COMPLICATION (HHS-HCC): ICD-10-CM

## 2024-07-31 RX ORDER — MONTELUKAST SODIUM 10 MG/1
10 TABLET ORAL DAILY
Qty: 90 TABLET | Refills: 1 | Status: SHIPPED | OUTPATIENT
Start: 2024-07-31

## 2024-08-05 LAB
CYTOLOGY CMNT CVX/VAG CYTO-IMP: NORMAL
LAB AP HPV GENOTYPE QUESTION: YES
LAB AP HPV HR: NORMAL
LABORATORY COMMENT REPORT: NORMAL
LMP START DATE: NORMAL
PATH REPORT.TOTAL CANCER: NORMAL

## 2024-08-06 ENCOUNTER — OFFICE VISIT (OUTPATIENT)
Age: 34
End: 2024-08-06
Payer: COMMERCIAL

## 2024-08-06 VITALS
WEIGHT: 217.5 LBS | SYSTOLIC BLOOD PRESSURE: 116 MMHG | HEART RATE: 73 BPM | DIASTOLIC BLOOD PRESSURE: 84 MMHG | BODY MASS INDEX: 42.7 KG/M2 | OXYGEN SATURATION: 99 % | HEIGHT: 60 IN

## 2024-08-06 DIAGNOSIS — J01.90 ACUTE SINUSITIS, RECURRENCE NOT SPECIFIED, UNSPECIFIED LOCATION: ICD-10-CM

## 2024-08-06 DIAGNOSIS — J45.30 MILD PERSISTENT REACTIVE AIRWAY DISEASE WITHOUT COMPLICATION (HHS-HCC): ICD-10-CM

## 2024-08-06 DIAGNOSIS — H10.32 ACUTE CONJUNCTIVITIS OF LEFT EYE, UNSPECIFIED ACUTE CONJUNCTIVITIS TYPE: Primary | ICD-10-CM

## 2024-08-06 RX ORDER — PREDNISONE 20 MG/1
20 TABLET ORAL DAILY
Qty: 5 TABLET | Refills: 0 | Status: SHIPPED | OUTPATIENT
Start: 2024-08-06 | End: 2024-08-11

## 2024-08-06 RX ORDER — CEFDINIR 300 MG/1
300 CAPSULE ORAL 2 TIMES DAILY
Qty: 20 CAPSULE | Refills: 0 | Status: SHIPPED | OUTPATIENT
Start: 2024-08-06 | End: 2024-08-16

## 2024-08-06 RX ORDER — FLUTICASONE PROPIONATE AND SALMETEROL 250; 50 UG/1; UG/1
1 POWDER RESPIRATORY (INHALATION) 2 TIMES DAILY
Qty: 60 EACH | Refills: 11 | Status: SHIPPED | OUTPATIENT
Start: 2024-08-06

## 2024-08-06 RX ORDER — OFLOXACIN 3 MG/ML
1 SOLUTION/ DROPS OPHTHALMIC 4 TIMES DAILY
Qty: 5 ML | Refills: 0 | Status: SHIPPED | OUTPATIENT
Start: 2024-08-06 | End: 2024-08-16

## 2024-08-06 NOTE — PROGRESS NOTES
Subjective   Patient ID: Ethel Su is a 34 y.o. female who presents for patient has had this cough/bronchitis for 2 months. Last visit she was starting to feel better then she states it went back to being worse again. She also thinks she might have pink eye. Woke up to their eye being red this morning.     HPI     Review of Systems    Objective   There were no vitals taken for this visit.    Physical Exam    Assessment/Plan

## 2024-08-06 NOTE — PROGRESS NOTES
Subjective   Ethel Su is a 34 y.o. female who presents for No chief complaint on file..  Here c/o cough, congestion, cold symptoms for the past couple of months.  Was treated for bronchitis a couple of times in June and July - felt like she was better but has gotten worse again recently.  She has been coughing and congested for about a week.  She woke up this morning with eye irritation and redness as well.              Objective   Visit Vitals  /84 (BP Location: Left arm, Patient Position: Sitting, BP Cuff Size: Adult)   Pulse 73      Physical Exam  Vitals reviewed.   Constitutional:       General: She is not in acute distress.  Eyes:      Comments: Conjunctivae injected on the left   Cardiovascular:      Rate and Rhythm: Normal rate and regular rhythm.      Heart sounds: No murmur heard.  Pulmonary:      Effort: Pulmonary effort is normal. No respiratory distress.      Breath sounds: Normal breath sounds.   Skin:     General: Skin is warm and dry.   Neurological:      General: No focal deficit present.      Mental Status: She is alert. Mental status is at baseline.         Assessment/Plan   Problem List Items Addressed This Visit    None  Visit Diagnoses       Acute conjunctivitis of left eye, unspecified acute conjunctivitis type    -  Primary    Relevant Medications    ofloxacin (Ocuflox) 0.3 % ophthalmic solution    Mild persistent reactive airway disease without complication ([facility])        Relevant Medications    fluticasone propion-salmeteroL (Advair Diskus) 250-50 mcg/dose diskus inhaler    Acute sinusitis, recurrence not specified, unspecified location        Relevant Medications    cefdinir (Omnicef) 300 mg capsule    predniSONE (Deltasone) 20 mg tablet               Denise Cano MD

## 2024-10-21 ENCOUNTER — APPOINTMENT (OUTPATIENT)
Dept: PRIMARY CARE | Facility: CLINIC | Age: 34
End: 2024-10-21
Payer: COMMERCIAL

## 2024-10-21 ENCOUNTER — LAB (OUTPATIENT)
Facility: LAB | Age: 34
End: 2024-10-21
Payer: COMMERCIAL

## 2024-10-21 VITALS
WEIGHT: 224.6 LBS | BODY MASS INDEX: 44.1 KG/M2 | SYSTOLIC BLOOD PRESSURE: 120 MMHG | HEIGHT: 60 IN | HEART RATE: 68 BPM | DIASTOLIC BLOOD PRESSURE: 90 MMHG | OXYGEN SATURATION: 99 %

## 2024-10-21 DIAGNOSIS — E55.9 VITAMIN D DEFICIENCY: ICD-10-CM

## 2024-10-21 DIAGNOSIS — R51.9 FREQUENT HEADACHES: ICD-10-CM

## 2024-10-21 DIAGNOSIS — K21.9 CHRONIC GERD: ICD-10-CM

## 2024-10-21 DIAGNOSIS — F41.8 ANXIETY WITH DEPRESSION: Primary | ICD-10-CM

## 2024-10-21 DIAGNOSIS — E66.01 CLASS 3 SEVERE OBESITY WITHOUT SERIOUS COMORBIDITY WITH BODY MASS INDEX (BMI) OF 40.0 TO 44.9 IN ADULT, UNSPECIFIED OBESITY TYPE: ICD-10-CM

## 2024-10-21 DIAGNOSIS — E66.813 CLASS 3 SEVERE OBESITY WITHOUT SERIOUS COMORBIDITY WITH BODY MASS INDEX (BMI) OF 40.0 TO 44.9 IN ADULT, UNSPECIFIED OBESITY TYPE: ICD-10-CM

## 2024-10-21 DIAGNOSIS — F41.8 ANXIETY WITH DEPRESSION: ICD-10-CM

## 2024-10-21 LAB
25(OH)D3 SERPL-MCNC: 15 NG/ML (ref 30–100)
ALBUMIN SERPL BCP-MCNC: 4.1 G/DL (ref 3.4–5)
ALP SERPL-CCNC: 82 U/L (ref 33–110)
ALT SERPL W P-5'-P-CCNC: 33 U/L (ref 7–45)
ANION GAP SERPL CALC-SCNC: 10 MMOL/L (ref 10–20)
AST SERPL W P-5'-P-CCNC: 20 U/L (ref 9–39)
BASOPHILS # BLD AUTO: 0.04 X10*3/UL (ref 0–0.1)
BASOPHILS NFR BLD AUTO: 0.7 %
BILIRUB SERPL-MCNC: 0.4 MG/DL (ref 0–1.2)
BUN SERPL-MCNC: 7 MG/DL (ref 6–23)
CALCIUM SERPL-MCNC: 9.1 MG/DL (ref 8.6–10.3)
CHLORIDE SERPL-SCNC: 105 MMOL/L (ref 98–107)
CHOLEST SERPL-MCNC: 180 MG/DL (ref 0–199)
CHOLESTEROL/HDL RATIO: 4.1
CO2 SERPL-SCNC: 26 MMOL/L (ref 21–32)
CREAT SERPL-MCNC: 0.51 MG/DL (ref 0.5–1.05)
EGFRCR SERPLBLD CKD-EPI 2021: >90 ML/MIN/1.73M*2
EOSINOPHIL # BLD AUTO: 0.1 X10*3/UL (ref 0–0.7)
EOSINOPHIL NFR BLD AUTO: 1.7 %
ERYTHROCYTE [DISTWIDTH] IN BLOOD BY AUTOMATED COUNT: 13.5 % (ref 11.5–14.5)
GLUCOSE SERPL-MCNC: 85 MG/DL (ref 74–99)
HCT VFR BLD AUTO: 39.3 % (ref 36–46)
HDLC SERPL-MCNC: 44 MG/DL
HGB BLD-MCNC: 12.2 G/DL (ref 12–16)
IMM GRANULOCYTES # BLD AUTO: 0.02 X10*3/UL (ref 0–0.7)
IMM GRANULOCYTES NFR BLD AUTO: 0.3 % (ref 0–0.9)
LDLC SERPL CALC-MCNC: 101 MG/DL
LYMPHOCYTES # BLD AUTO: 2.25 X10*3/UL (ref 1.2–4.8)
LYMPHOCYTES NFR BLD AUTO: 37.9 %
MAGNESIUM SERPL-MCNC: 1.67 MG/DL (ref 1.6–2.4)
MCH RBC QN AUTO: 26.8 PG (ref 26–34)
MCHC RBC AUTO-ENTMCNC: 31 G/DL (ref 32–36)
MCV RBC AUTO: 86 FL (ref 80–100)
MONOCYTES # BLD AUTO: 0.37 X10*3/UL (ref 0.1–1)
MONOCYTES NFR BLD AUTO: 6.2 %
NEUTROPHILS # BLD AUTO: 3.15 X10*3/UL (ref 1.2–7.7)
NEUTROPHILS NFR BLD AUTO: 53.2 %
NON HDL CHOLESTEROL: 136 MG/DL (ref 0–149)
NRBC BLD-RTO: 0 /100 WBCS (ref 0–0)
PLATELET # BLD AUTO: 355 X10*3/UL (ref 150–450)
POTASSIUM SERPL-SCNC: 4.3 MMOL/L (ref 3.5–5.3)
PROT SERPL-MCNC: 6.4 G/DL (ref 6.4–8.2)
RBC # BLD AUTO: 4.56 X10*6/UL (ref 4–5.2)
SODIUM SERPL-SCNC: 137 MMOL/L (ref 136–145)
TRIGL SERPL-MCNC: 176 MG/DL (ref 0–149)
TSH SERPL-ACNC: 1.75 MIU/L (ref 0.44–3.98)
VIT B12 SERPL-MCNC: 189 PG/ML (ref 211–911)
VLDL: 35 MG/DL (ref 0–40)
WBC # BLD AUTO: 5.9 X10*3/UL (ref 4.4–11.3)

## 2024-10-21 PROCEDURE — 84443 ASSAY THYROID STIM HORMONE: CPT

## 2024-10-21 PROCEDURE — 80061 LIPID PANEL: CPT

## 2024-10-21 PROCEDURE — 85025 COMPLETE CBC W/AUTO DIFF WBC: CPT

## 2024-10-21 PROCEDURE — 83735 ASSAY OF MAGNESIUM: CPT

## 2024-10-21 PROCEDURE — 99213 OFFICE O/P EST LOW 20 MIN: CPT | Performed by: FAMILY MEDICINE

## 2024-10-21 PROCEDURE — 82306 VITAMIN D 25 HYDROXY: CPT

## 2024-10-21 PROCEDURE — 3008F BODY MASS INDEX DOCD: CPT | Performed by: FAMILY MEDICINE

## 2024-10-21 PROCEDURE — 1036F TOBACCO NON-USER: CPT | Performed by: FAMILY MEDICINE

## 2024-10-21 PROCEDURE — 82607 VITAMIN B-12: CPT

## 2024-10-21 PROCEDURE — 80053 COMPREHEN METABOLIC PANEL: CPT

## 2024-10-21 PROCEDURE — 36415 COLL VENOUS BLD VENIPUNCTURE: CPT

## 2024-10-21 NOTE — PROGRESS NOTES
Subjective   Patient ID: Ethel Su is a 34 y.o. female who presents for 6 month check up.     HPI     Review of Systems    Objective   There were no vitals taken for this visit.    Physical Exam    Assessment/Plan

## 2024-10-21 NOTE — PROGRESS NOTES
Subjective   Ethel Su is a 34 y.o. female who presents for No chief complaint on file..  Here for routine follow up anxiety/depression, headaches, vitamin D def.  She is doing pretty well at this time.              Objective   Visit Vitals  /90 (BP Location: Left arm, Patient Position: Sitting, BP Cuff Size: Adult)   Pulse 68      Physical Exam  Vitals reviewed.   Constitutional:       General: She is not in acute distress.  Cardiovascular:      Rate and Rhythm: Normal rate and regular rhythm.      Heart sounds: No murmur heard.  Pulmonary:      Effort: Pulmonary effort is normal. No respiratory distress.      Breath sounds: Normal breath sounds.   Skin:     General: Skin is warm and dry.   Neurological:      General: No focal deficit present.      Mental Status: She is alert. Mental status is at baseline.         Assessment/Plan   Problem List Items Addressed This Visit       Anxiety with depression - Primary    Relevant Orders    CBC and Auto Differential    Comprehensive Metabolic Panel    Lipid Panel    TSH with reflex to Free T4 if abnormal    Vitamin B12    Vitamin D 25-Hydroxy,Total (for eval of Vitamin D levels)    Magnesium    Chronic GERD    Frequent headaches    Relevant Orders    CBC and Auto Differential    Comprehensive Metabolic Panel    Lipid Panel    TSH with reflex to Free T4 if abnormal    Vitamin B12    Vitamin D 25-Hydroxy,Total (for eval of Vitamin D levels)    Magnesium    Class 3 severe obesity without serious comorbidity with body mass index (BMI) of 40.0 to 44.9 in adult    Relevant Orders    CBC and Auto Differential    Comprehensive Metabolic Panel    Lipid Panel    TSH with reflex to Free T4 if abnormal    Vitamin B12    Vitamin D 25-Hydroxy,Total (for eval of Vitamin D levels)    Magnesium     Other Visit Diagnoses       Vitamin D deficiency        Relevant Orders    Vitamin D 25-Hydroxy,Total (for eval of Vitamin D levels)               Denise Cano MD

## 2024-10-23 DIAGNOSIS — E55.9 VITAMIN D DEFICIENCY: Primary | ICD-10-CM

## 2024-10-23 DIAGNOSIS — R79.89 LOW VITAMIN D LEVEL: Primary | ICD-10-CM

## 2024-10-23 DIAGNOSIS — E53.8 VITAMIN B12 DEFICIENCY: ICD-10-CM

## 2024-10-23 RX ORDER — LANOLIN ALCOHOL/MO/W.PET/CERES
1000 CREAM (GRAM) TOPICAL DAILY
Qty: 90 TABLET | Refills: 3 | Status: SHIPPED | OUTPATIENT
Start: 2024-10-23

## 2024-10-23 RX ORDER — ERGOCALCIFEROL 1.25 MG/1
50000 CAPSULE ORAL WEEKLY
Qty: 12 CAPSULE | Refills: 3 | Status: SHIPPED | OUTPATIENT
Start: 2024-10-23

## 2024-10-23 NOTE — RESULT ENCOUNTER NOTE
Please let patient know that her vitamin D and vitamin B12 levels were low and I sent in prescriptions for her.  I would recommend rechecking in 3 months.  Otherwise labs were ok.  Thanks.

## 2024-11-04 ENCOUNTER — OFFICE VISIT (OUTPATIENT)
Dept: URGENT CARE | Facility: CLINIC | Age: 34
End: 2024-11-04
Payer: COMMERCIAL

## 2024-11-04 VITALS
TEMPERATURE: 103.2 F | BODY MASS INDEX: 43.19 KG/M2 | OXYGEN SATURATION: 95 % | WEIGHT: 220 LBS | RESPIRATION RATE: 16 BRPM | HEART RATE: 108 BPM | SYSTOLIC BLOOD PRESSURE: 122 MMHG | DIASTOLIC BLOOD PRESSURE: 82 MMHG | HEIGHT: 60 IN

## 2024-11-04 DIAGNOSIS — J20.9 ACUTE BRONCHITIS, UNSPECIFIED ORGANISM: Primary | ICD-10-CM

## 2024-11-04 LAB
POC CORONAVIRUS 2019 BY PCR (COV19): NOT DETECTED
POC FLU A RESULT: NOT DETECTED
POC FLU B RESULT: NOT DETECTED
POC RSV PCR: NOT DETECTED

## 2024-11-04 PROCEDURE — 99213 OFFICE O/P EST LOW 20 MIN: CPT | Performed by: NURSE PRACTITIONER

## 2024-11-04 RX ORDER — PREDNISONE 10 MG/1
TABLET ORAL
Qty: 21 TABLET | Refills: 0 | Status: SHIPPED | OUTPATIENT
Start: 2024-11-04 | End: 2024-11-09

## 2024-11-04 RX ORDER — AZITHROMYCIN 250 MG/1
TABLET, FILM COATED ORAL
Qty: 6 TABLET | Refills: 0 | Status: SHIPPED | OUTPATIENT
Start: 2024-11-04 | End: 2024-11-09

## 2024-11-04 NOTE — PROGRESS NOTES
34 y.o. female presents for evaluation of URI.  Symptoms including fever, cough, congestion, body aches, malaise, and headache have been present for several days and refractory to OTC meds.  No nausea, vomiting, abdominal pain, CP, or SOB.  No exacerbating factors. No known COVID 19/flu exposure.        Vitals:    24 1428   BP: 122/82   Pulse: 108   Resp: 16   Temp: (!) 39.6 °C (103.2 °F)   SpO2: 95%       No Known Allergies    Medication Documentation Review Audit       Reviewed by Karen Kennedy MA (Medical Assistant) on 24 at 1427      Medication Order Taking? Sig Documenting Provider Last Dose Status   calcium citrate-vitamin D3 (Citracal+D) 315 mg-5 mcg (200 unit) tablet [id number] Yes Take 1 tablet by mouth once daily. Historical Provider, MD Taking Active   cyanocobalamin (Vitamin B-12) 1,000 mcg tablet [id number] Yes Take 1 tablet (1,000 mcg) by mouth once daily. Denise Cano MD  Active   ergocalciferol (Vitamin D-2) 1.25 MG (10369 UT) capsule [id number] Yes Take 1 capsule (50,000 Units) by mouth 1 (one) time per week. Denise Cano MD  Active   hydrocortisone 2.5 % cream [id number] Yes Apply topically 2 times a day as needed for irritation or rash. Denise Cano MD Taking Active   metoprolol succinate XL (Toprol-XL) 25 mg 24 hr tablet [id number] No Take 1 tablet (25 mg) by mouth once daily. Do not crush or chew. Denise Cano MD Taking  10/21/24 2353   multivit-min/ferrous fumarate (MULTI VITAMIN ORAL) [id number] Yes Take 1 capsule by mouth once daily. Historical Provider, MD Taking Active   venlafaxine XR (Effexor-XR) 75 mg 24 hr capsule [id number] Yes Take 1 capsule (75 mg) by mouth once daily in the morning. Historical Provider, MD Taking Active                    Past Medical History:   Diagnosis Date    Anxiety     Arthritis     COVID-19 2021    COVID    Depression     Encounter for  delivery without indication ([facility])     Delivery of  pregnancy by  section    Encounter for routine postpartum follow-up 2022    Postpartum exam    Encounter for routine postpartum follow-up 2021    Postpartum care following  delivery    Gestational diabetes mellitus in pregnancy, diet controlled ([facility]) 2020    Diet controlled gestational diabetes mellitus (GDM) in third trimester    Migraine, unspecified, not intractable, without status migrainosus 2018    Migraine    Other conditions influencing health status     Menstruation    Other specified pregnancy related conditions, unspecified trimester ([facility]) 2022    Rh negative, antepartum    Pain in right wrist 2021    Right wrist pain       Past Surgical History:   Procedure Laterality Date     SECTION, LOW TRANSVERSE  2020    CHOLECYSTECTOMY  2018    TONSILLECTOMY      TUBAL LIGATION  2022       ROS  See HPI    Physical Exam  Vitals and nursing note reviewed.   Constitutional:       Appearance: She is ill-appearing (mildly).   HENT:      Head: Normocephalic and atraumatic.      Right Ear: Tympanic membrane and ear canal normal.      Left Ear: Tympanic membrane and ear canal normal.      Nose: Congestion present.      Mouth/Throat:      Mouth: Mucous membranes are moist.      Pharynx: Oropharynx is clear.   Eyes:      Extraocular Movements: Extraocular movements intact.      Conjunctiva/sclera: Conjunctivae normal.      Pupils: Pupils are equal, round, and reactive to light.   Cardiovascular:      Rate and Rhythm: Normal rate.   Pulmonary:      Effort: Pulmonary effort is normal.      Breath sounds: Normal breath sounds.   Lymphadenopathy:      Cervical: Cervical adenopathy present.   Skin:     General: Skin is warm and dry.   Neurological:      General: No focal deficit present.      Mental Status: She is alert and oriented to person, place, and time.   Psychiatric:         Mood and Affect: Mood normal.         Behavior: Behavior normal.        Recent Results (from the past hour)   POCT SARS-COV-2/FLU/RSV PCR SYMPTOMATIC manually resulted    Collection Time: 11/04/24  3:13 PM   Result Value Ref Range    POC Coronavirus 2019, PCR Not Detected Not Detected    POC Flu A Result Not Detected Not Detected    POC Flu B Result Not Detected Not Detected    POC RSV PCR Not Detected Not Detected       Assessment/Plan/MDM  Ethel was seen today for uri.  Diagnoses and all orders for this visit:  Acute bronchitis, unspecified organism (Primary)  -     azithromycin (Zithromax Z-J Carlos) 250 mg tablet; Take 2 tablets (500 mg) on  Day 1,  followed by 1 tablet (250 mg) once daily on Days 2 through 5.  -     predniSONE (Deltasone) 10 mg tablet; Take 6 tablets (60 mg) by mouth once daily for 1 day, THEN 5 tablets (50 mg) once daily for 1 day, THEN 4 tablets (40 mg) once daily for 1 day, THEN 3 tablets (30 mg) once daily for 1 day, THEN 2 tablets (20 mg) once daily for 1 day, THEN 1 tablet (10 mg) once daily for 1 day.  -     POCT SARS-COV-2/FLU/RSV PCR SYMPTOMATIC manually resulted    Pt has not eaten today and would prefer to go home and eat and then take otc fever reducer to decrease change of n/v. Encouraged pt to use otc cold remedies PRN, push PO fluids and rest. Patient's clinical presentation is otherwise unremarkable at this time. Patient is discharged with instructions to follow-up with primary care or seek emergency medical attention for worsening symptoms or any new concerns.      I did personally review Ethel's past medical history, surgical history, social history, as well as family history (when relevant).  In this case, I also oversaw the her drug management by reviewing her medication list, allergy list, as well as the medications that I prescribed during the UC course and/or recommended as an out-patient (including possible OTC medications such as acetaminophen, NSAIDs , etc).    After reviewing the items above, I did look at previous medical  documentation, such as recent hospitalizations, office visits, and/or recent consultations with PCP/specialist.                          SDOH:   Another factor that I considered in Ethel's care was her Social Determinants of Health (SDOH). During this UC encounter, she did not have social determinants of health. Those SDOH influencing Ethel's care are: none      Nolberto Baumann CNP  [facility]  [phone]

## 2024-12-27 ENCOUNTER — OFFICE VISIT (OUTPATIENT)
Age: 34
End: 2024-12-27
Payer: COMMERCIAL

## 2024-12-27 VITALS
WEIGHT: 220 LBS | HEIGHT: 60 IN | HEART RATE: 63 BPM | BODY MASS INDEX: 43.19 KG/M2 | SYSTOLIC BLOOD PRESSURE: 122 MMHG | DIASTOLIC BLOOD PRESSURE: 68 MMHG | OXYGEN SATURATION: 99 %

## 2024-12-27 DIAGNOSIS — L98.9 SKIN LESION: Primary | ICD-10-CM

## 2024-12-27 DIAGNOSIS — R79.89 LOW VITAMIN D LEVEL: ICD-10-CM

## 2024-12-27 DIAGNOSIS — R22.1 LUMP IN NECK: ICD-10-CM

## 2024-12-27 DIAGNOSIS — R20.0 BILATERAL HAND NUMBNESS: ICD-10-CM

## 2024-12-27 PROCEDURE — 1036F TOBACCO NON-USER: CPT | Performed by: FAMILY MEDICINE

## 2024-12-27 PROCEDURE — 3008F BODY MASS INDEX DOCD: CPT | Performed by: FAMILY MEDICINE

## 2024-12-27 PROCEDURE — 99214 OFFICE O/P EST MOD 30 MIN: CPT | Performed by: FAMILY MEDICINE

## 2024-12-27 RX ORDER — ERGOCALCIFEROL 1.25 MG/1
50000 CAPSULE ORAL WEEKLY
Qty: 12 CAPSULE | Refills: 3 | Status: SHIPPED | OUTPATIENT
Start: 2024-12-27

## 2024-12-27 NOTE — PROGRESS NOTES
Subjective   Ethel Su is a 34 y.o. female who presents for Follow-up (LUMP ON BACK NECK/ HEAD, HANDS GOING NUMB INTERMITTENT, AND MOLE ON CHIN ).  Here c/o mole on her chin  She states that she has had the mole her whole life but recently started having issues with a pimple there.  She states that it was on the edge of the mole and came back a couple of times. It is not there right now.        She is having issues with her hands going numb.  Started a while ago - initially thought it was related to taking effexor that she started over a year ago.  She is weaning off that and the numbness is getting worse.  She states that within 5 minutes of driving she has to move her hands, sometimes it lasts a while.        She also has a lump on the back of her head/neck that has been there for years.               Objective   Visit Vitals  /68   Pulse 63      Physical Exam  Vitals reviewed.   Constitutional:       General: She is not in acute distress.  Neck:      Comments: There are two approx 1 cm soft lumps in the posterior neck on the right.    Cardiovascular:      Rate and Rhythm: Normal rate.   Pulmonary:      Effort: Pulmonary effort is normal. No respiratory distress.   Skin:     General: Skin is warm and dry.   Neurological:      General: No focal deficit present.      Mental Status: She is alert. Mental status is at baseline.         Assessment/Plan   Problem List Items Addressed This Visit       Low vitamin D level    Relevant Medications    ergocalciferol (Vitamin D-2) 1.25 MG (75308 UT) capsule     Other Visit Diagnoses       Skin lesion    -  Primary    Relevant Orders    Referral to Dermatology    Bilateral hand numbness        Relevant Orders    EMG & nerve conduction    Lump in neck        Relevant Orders    US head neck soft tissue               Denise Cano MD

## 2024-12-27 NOTE — PATIENT INSTRUCTIONS
Will refer to derm for the mole, will get EMG/NCV for the numbness, will get US of the lumps in the neck.  Follow up after testing.

## 2024-12-30 ENCOUNTER — HOSPITAL ENCOUNTER (OUTPATIENT)
Dept: RADIOLOGY | Facility: HOSPITAL | Age: 34
Discharge: HOME | End: 2024-12-30
Payer: COMMERCIAL

## 2024-12-30 DIAGNOSIS — R22.1 LUMP IN NECK: ICD-10-CM

## 2024-12-30 PROCEDURE — 76536 US EXAM OF HEAD AND NECK: CPT

## 2024-12-30 PROCEDURE — 76536 US EXAM OF HEAD AND NECK: CPT | Performed by: RADIOLOGY

## 2025-01-02 ENCOUNTER — APPOINTMENT (OUTPATIENT)
Age: 35
End: 2025-01-02
Payer: COMMERCIAL

## 2025-01-02 NOTE — RESULT ENCOUNTER NOTE
Please let patient know that her CT showed two benign appearing lymph nodes.  At this point I would recommend we continue to monitor, but if they change/grow we will investigate further.  Thanks.

## 2025-02-11 ENCOUNTER — APPOINTMENT (OUTPATIENT)
Age: 35
End: 2025-02-11
Payer: COMMERCIAL

## 2025-02-11 VITALS
WEIGHT: 223.9 LBS | HEART RATE: 76 BPM | OXYGEN SATURATION: 97 % | SYSTOLIC BLOOD PRESSURE: 120 MMHG | BODY MASS INDEX: 43.96 KG/M2 | HEIGHT: 60 IN | DIASTOLIC BLOOD PRESSURE: 72 MMHG

## 2025-02-11 DIAGNOSIS — G56.03 BILATERAL CARPAL TUNNEL SYNDROME: Primary | ICD-10-CM

## 2025-02-11 PROCEDURE — 99213 OFFICE O/P EST LOW 20 MIN: CPT | Performed by: FAMILY MEDICINE

## 2025-02-11 PROCEDURE — 1036F TOBACCO NON-USER: CPT | Performed by: FAMILY MEDICINE

## 2025-02-11 PROCEDURE — 3008F BODY MASS INDEX DOCD: CPT | Performed by: FAMILY MEDICINE

## 2025-02-11 NOTE — PROGRESS NOTES
Subjective   Ethel Su is a 35 y.o. female who presents for Follow-up (Follow up after testing. ).  Here for follow up recent testing.  She had EMG/NCV done which showed bilateral carpal tunnel syndrome.              Objective   Visit Vitals  /72   Pulse 76      Physical Exam  Vitals reviewed.   Constitutional:       General: She is not in acute distress.  Cardiovascular:      Rate and Rhythm: Normal rate.   Pulmonary:      Effort: Pulmonary effort is normal. No respiratory distress.   Skin:     General: Skin is warm and dry.   Neurological:      General: No focal deficit present.      Mental Status: She is alert. Mental status is at baseline.         Assessment/Plan   Problem List Items Addressed This Visit    None  Visit Diagnoses       Bilateral carpal tunnel syndrome    -  Primary    Relevant Orders    Referral to Orthopaedic Surgery               Denise Cano MD

## 2025-03-05 ENCOUNTER — APPOINTMENT (OUTPATIENT)
Dept: ORTHOPEDIC SURGERY | Facility: CLINIC | Age: 35
End: 2025-03-05
Payer: COMMERCIAL

## 2025-03-05 ENCOUNTER — HOSPITAL ENCOUNTER (OUTPATIENT)
Dept: RADIOLOGY | Facility: EXTERNAL LOCATION | Age: 35
Discharge: HOME | End: 2025-03-05

## 2025-03-05 ENCOUNTER — HOSPITAL ENCOUNTER (OUTPATIENT)
Dept: RADIOLOGY | Facility: CLINIC | Age: 35
Discharge: HOME | End: 2025-03-05
Payer: COMMERCIAL

## 2025-03-05 VITALS — WEIGHT: 222.2 LBS | BODY MASS INDEX: 43.4 KG/M2

## 2025-03-05 DIAGNOSIS — G56.03 BILATERAL CARPAL TUNNEL SYNDROME: ICD-10-CM

## 2025-03-05 PROCEDURE — 76882 US LMTD JT/FCL EVL NVASC XTR: CPT | Performed by: SPECIALIST

## 2025-03-05 PROCEDURE — 1036F TOBACCO NON-USER: CPT | Performed by: SPECIALIST

## 2025-03-05 PROCEDURE — 99204 OFFICE O/P NEW MOD 45 MIN: CPT | Performed by: SPECIALIST

## 2025-03-05 PROCEDURE — 73110 X-RAY EXAM OF WRIST: CPT | Mod: 50

## 2025-03-05 RX ORDER — METRONIDAZOLE 7.5 MG/G
CREAM TOPICAL
COMMUNITY
Start: 2025-01-14

## 2025-03-05 RX ORDER — KETOCONAZOLE 20 MG/ML
SHAMPOO, SUSPENSION TOPICAL
COMMUNITY
Start: 2025-01-14

## 2025-03-05 RX ORDER — KETOCONAZOLE 20 MG/G
CREAM TOPICAL
COMMUNITY
Start: 2025-01-14

## 2025-03-05 ASSESSMENT — PAIN SCALES - GENERAL
PAINLEVEL_OUTOF10: 2
PAINLEVEL_OUTOF10: 3

## 2025-03-05 ASSESSMENT — PAIN DESCRIPTION - DESCRIPTORS
DESCRIPTORS: NUMBNESS;TINGLING
DESCRIPTORS: NUMBNESS;TINGLING

## 2025-03-05 ASSESSMENT — PAIN - FUNCTIONAL ASSESSMENT: PAIN_FUNCTIONAL_ASSESSMENT: 0-10

## 2025-03-05 NOTE — ASSESSMENT & PLAN NOTE
Assessment: Bilateral carpal tunnel syndrome right greater than left.  Patient is a mom with 3 children the youngest being 3 years.  She gets symptoms at night or if she is driving.  She can usually relieve the symptoms by shaking the hand.  She does not always have numbness and tingling in her fingers.    Plan:  Patient was advised of the risks, benefits, alternatives and complications of a carpal tunnel release.   The patient agreed to proceed. The patient was informed of the risk of poor healing, infection, nerve and blood vessel injury, persistence of numbness and other symptoms and rarely nerve or tendon injury. The patient agreed to proceed.  She is scheduled for 4/28/2025.  Follow-up a few weeks prior to the surgery for her H&P and preoperative testing and other aspects of preparation for surgery.

## 2025-03-05 NOTE — PROGRESS NOTES
Assessment/Plan   Encounter Diagnoses:  Bilateral carpal tunnel syndrome  Bilateral carpal tunnel syndrome  Assessment: Bilateral carpal tunnel syndrome right greater than left.  Patient is a mom with 3 children the youngest being 3 years.  She gets symptoms at night or if she is driving.  She can usually relieve the symptoms by shaking the hand.  She does not always have numbness and tingling in her fingers.  She understands that one of the bigger risks in young people is recurrence.  She denied any history of inflammatory disease or diabetes.  She states she is otherwise healthy.  Her size may contribute somewhat.    Plan:  Patient was advised of the risks, benefits, alternatives and complications of a carpal tunnel release.   The patient agreed to proceed. The patient was informed of the risk of poor healing, infection, nerve and blood vessel injury, persistence of numbness and other symptoms and rarely nerve or tendon injury. The patient agreed to proceed.  She is scheduled for 4/28/2025.  Follow-up a few weeks prior to the surgery for her H&P and preoperative testing and other aspects of preparation for surgery.       Subjective    Patient ID: Ethel Su is a 35 y.o. female.    Chief Complaint: New Patient Visit of the Left Wrist and New Patient Visit of the Right Wrist     Last Surgery: No surgery found  Last Surgery Date: No surgery found    HPI  35-year-old female who has carpal tunnel syndrome symptoms 2 or 3 times a day.  Sometimes more.  She gets numbness and tingling she points to the median nerve distribution.  This sometimes awakens her at night.  An EMG and nerve conduction study was obtained which showed the mild bilateral carpal tunnel syndrome without axonal damage the right was worse than the left.  She is right-hand dominant.    OBJECTIVE: ORTHO EXAM  Right wrist/hand:  No obvious swelling or masses  Thenar eminence muscle mass: Within normal limits  No atrophy noted of hypothenar eminence    Equivocal Tinel's at carpal tunnel  + Median nerve compression test- Arely's Test  + Phalen's test  Decreased sensation to light touch and 2 point discrimination in median nerve distribution  Motor exam within normal limits  Good capillary refill        Left wrist/hand:  No obvious swelling or masses  Thenar eminence muscle mass: Within normal limits  No atrophy noted of hypothenar eminence   Positive Tinel's at carpal tunnel  + Median nerve compression test- Arely's Test  + Phalen's test  Decreased sensation to light touch and 2 point discrimination in median nerve distribution  Motor exam within normal limits  Good capillary refill  IMAGE RESULTS:  Point of Care Ultrasound  These images are not reportable by radiology and will not be interpreted   by  Radiologists.      ULTRASOUND  Wrist Ultrasound    DIAGNOSTIC ULTRASOUND REPORT FINAL: Right HAND AND WRIST  Sonographer: Daktoa Fierro MD  Procedure: Ultrasound, extremity, nonvascular, real-time, COMPLETE, anatomic specific.  Indication: Wrist Pain  Technique: B-Mode Ultrasound Examination performed using 8-13 MHz linear transducer with [employer] Software  STUDY TYPE:  1. ULTRASOUND EXTREMITY  2. REAL TIME WITH IMAGE DOCUMENTATION  3. NON-VASCULAR  4. COMPLETE STUDY  Site: LEFT HAND AND WRIST  Live ultrasound was performed with the patient's HAND AND WRIST and PERMANENTLY documented. COMPLETE and FINAL ULTRASOUND REPORT of the patient's  HAND AND WRIST. . I personally performed the ultrasound and reviewed the findings. These show:    Live ultrasound was performed of the patient's HAND AND WRIST that shows intact Extensor digitorum communis, Extensor digiti minimi, Extensor indicis proprius, Extensor Pollicus Longus, Flexor Pollicus Longus, Flexor digitorum profundus, Flexor digitorum superficialis tendon with the THENAR and HYPOTHENAR muscle fibers showing normal striations. NO FLUID NOTED WITHIN THE CARPAL TUNNEL, THE MEDIAN NERVE SHOWS NORMAL PATTERN OF  ECHOGENICITY. The median n. remains swollen.  The tendons appear normal in appearance and size as they pass the styloid process. No fracture is appreciated. Nonsterile gloves were used for this procedure  gauze pads were then used to clean the area after the procedure was compete. The patient tolerated the procedure well.  The median nerve is swollen with an echogenic halo.    Wrist Ultrasound    DIAGNOSTIC ULTRASOUND REPORT FINAL: Left HAND AND WRIST  Sonographer: Dakota Fierro MD  Procedure: Ultrasound, extremity, nonvascular, real-time, COMPLETE, anatomic specific.  Indication: Wrist Pain  Technique: B-Mode Ultrasound Examination performed using 8-13 MHz linear transducer with [employer] Software  STUDY TYPE:  1. ULTRASOUND EXTREMITY  2. REAL TIME WITH IMAGE DOCUMENTATION  3. NON-VASCULAR  4. COMPLETE STUDY  Site: LEFT HAND AND WRIST  Live ultrasound was performed with the patient's HAND AND WRIST and PERMANENTLY documented. COMPLETE and FINAL ULTRASOUND REPORT of the patient's  HAND AND WRIST. . I personally performed the ultrasound and reviewed the findings. These show:    Live ultrasound was performed of the patient's HAND AND WRIST that shows intact Extensor digitorum communis, Extensor digiti minimi, Extensor indicis proprius, Extensor Pollicus Longus, Flexor Pollicus Longus, Flexor digitorum profundus, Flexor digitorum superficialis tendon with the THENAR and HYPOTHENAR muscle fibers showing normal striations. NO FLUID NOTED WITHIN THE CARPAL TUNNEL, THE MEDIAN NERVE SHOWS NORMAL PATTERN OF ECHOGENICITY. The median n. remains swollen.  The tendons appear normal in appearance and size as they pass the styloid process. No fracture is appreciated. Nonsterile gloves were used for this procedure  gauze pads were then used to clean the area after the procedure was compete. The patient tolerated the procedure well.    Median nerve is swollen with an echogenic halo.    Procedures     Orders Placed This Encounter    Point  of Care Ultrasound    XR wrist 3+ views bilateral

## 2025-03-10 ENCOUNTER — PREP FOR PROCEDURE (OUTPATIENT)
Dept: ORTHOPEDIC SURGERY | Facility: CLINIC | Age: 35
End: 2025-03-10
Payer: COMMERCIAL

## 2025-03-10 DIAGNOSIS — G56.03 BILATERAL CARPAL TUNNEL SYNDROME: ICD-10-CM

## 2025-03-11 RX ORDER — CEFAZOLIN SODIUM 2 G/100ML
2 INJECTION, SOLUTION INTRAVENOUS ONCE
OUTPATIENT
Start: 2025-03-11

## 2025-03-15 ENCOUNTER — OFFICE VISIT (OUTPATIENT)
Dept: URGENT CARE | Facility: CLINIC | Age: 35
End: 2025-03-15
Payer: COMMERCIAL

## 2025-03-15 VITALS
WEIGHT: 220 LBS | SYSTOLIC BLOOD PRESSURE: 121 MMHG | HEART RATE: 79 BPM | DIASTOLIC BLOOD PRESSURE: 82 MMHG | TEMPERATURE: 98.8 F | BODY MASS INDEX: 43.19 KG/M2 | OXYGEN SATURATION: 97 % | HEIGHT: 60 IN | RESPIRATION RATE: 16 BRPM

## 2025-03-15 DIAGNOSIS — J20.9 ACUTE BRONCHITIS, UNSPECIFIED ORGANISM: Primary | ICD-10-CM

## 2025-03-15 PROCEDURE — 99213 OFFICE O/P EST LOW 20 MIN: CPT | Performed by: NURSE PRACTITIONER

## 2025-03-15 RX ORDER — DOXYCYCLINE 100 MG/1
100 TABLET ORAL 2 TIMES DAILY
Qty: 20 TABLET | Refills: 0 | Status: SHIPPED | OUTPATIENT
Start: 2025-03-15 | End: 2025-03-25

## 2025-03-15 RX ORDER — PREDNISONE 20 MG/1
20 TABLET ORAL DAILY
Qty: 3 TABLET | Refills: 0 | Status: SHIPPED | OUTPATIENT
Start: 2025-03-15 | End: 2025-03-18

## 2025-03-15 NOTE — PROGRESS NOTES
[facility]  Breanne Zuluaga, APRN-CNP     Visit Note - 3/15/2025 8:34 AM   This note was generated with voice recognition software and may contain errors including spelling, grammar, syntax, and misrecognization of what was dictated.    Patient: Ethel Su, MRN: [id number], 35 y.o., female   PCP: Denise Cano MD  ------------------------------------  ALLERGIES: No Known Allergies     CURRENT MEDICATIONS:   Current Outpatient Medications   Medication Instructions    calcium citrate-vitamin D3 (Citracal+D) 315 mg-5 mcg (200 unit) tablet 1 tablet, Daily    cyanocobalamin (VITAMIN B-12) 1,000 mcg, oral, Daily    doxycycline (ADOXA) 100 mg, oral, 2 times daily, Take with a full glass of water and do not lie down for at least 30 minutes after.    ergocalciferol (VITAMIN D-2) 50,000 Units, oral, Weekly    hydrocortisone 2.5 % cream Topical, 2 times daily PRN    ketoconazole (NIZOral) 2 % cream APPLY a thin layer TO THE AFFECTED AREA(S) OF the ears EVERY other day for flaring, then ONCE A WEEK for maintenance    ketoconazole (NIZOral) 2 % shampoo wash the scalp EVERY other wash for flares, then ONCE A WEEK for maintenance lathering and letiing sit for 3-5 MINUTES before rinsing    metroNIDAZOLE (Metrocream) 0.75 % cream APPLY a thin layer to the cheeks, around the mouth and chin TWICE DAILY for flares    multivit-min/ferrous fumarate (MULTI VITAMIN ORAL) 1 capsule, Daily RT    predniSONE (DELTASONE) 20 mg, oral, Daily, Take with a meal.    venlafaxine XR (EFFEXOR-XR) 75 mg, Every morning     ------------------------------------  PAST MEDICAL HX:  Patient Active Problem List   Diagnosis    Anxiety with depression    Arthritis    Chronic GERD    Dizziness    Fatigue    Frequent headaches    Low vitamin D level    Menorrhagia with irregular cycle    Class 3 severe obesity without serious comorbidity with body mass index (BMI) of 40.0 to 44.9 in adult    Eczema    Bilateral carpal tunnel  syndrome      SURGICAL HX:  Past Surgical History:   Procedure Laterality Date     SECTION, LOW TRANSVERSE  2020    CHOLECYSTECTOMY  2018    TONSILLECTOMY      TUBAL LIGATION  2022      FAMILY HX:   No pertinent history.   SOCIAL HX:    reports that she has never smoked. She has never used smokeless tobacco. Has kids.   ------------------------------------  CHIEF COMPLAINT:   Chief Complaint   Patient presents with    URI     Cough, congestion, wheezing, x 2 days      HISTORY OF PRESENT ILLNESS: The history was obtained from patient. Ethel is a 35 y.o. female, who presents with a chief complaint of a dry cough and chest congestion; has also had an intermittent sore throat and PND. Sxs initially started ~2 weeks ago, and seemed to be gradually improving until ~2-3 days ago, when they started to get significantly worse again- is concerned she could have pneumonia. Denies any fever/chills, body aches, ear pain, abdominal pain, chest pain, wheezing/shortness of breath, rashes, urinary symptoms, nausea/vomiting, and diarrhea. Denies any lightheadedness or dizziness; no changes in mental status. No swelling in legs. Appetite is decreased ; is able to eat and drink fluids without difficulty; denies loss of sense of taste or smell. Reports symptoms have gotten worse since onset. Has been taking  Mucinex and cough syrup  without much relief; no other over-the-counter medications or home remedies for symptom management.  Several family members have been ill -recently - one dx'd with pneumonia, and another with GI symptoms; no other known ill contacts. Has received the COVID vaccine x 1. Has not received this season's influenza vaccine.. Last known COVID infection was in . Is not a smoker.  No known history of asthma, but does have history of pleurisy.    REVIEW OF SYSTEMS:  10 systems reviewed negative with exception of history of present illness as listed above.    TODAY'S VITALS: /82   Pulse  79   Temp 37.1 °C (98.8 °F)   Resp 16   Ht 1.524 m (5')   Wt 99.8 kg (220 lb)   SpO2 97%   BMI 42.97 kg/m²     PHYSICAL EXAMINATION:  General:  Mildly ill-appearing, well nourished female; alert and oriented; in no acute distress. Sitting comfortably on exam chair. Non-dyspneic.   Eyes:  Pupils equal, round and reactive to light. No conjunctival erythema; no scleral icterus.   HENT: No frontal or maxillary sinus tenderness; + audible nasal congestion. Airway patent, TMs and ear canals clear/unremarkable bilaterally. Nasal mucosa mildly injected and edematous. Oral mucosa moist. Posterior pharynx mildly injected but without lesions or oropharyngeal exudate aside from PND. Uvula is midline. Managing oral secretions without difficulty.  Neck:  Supple. Mildly tender, mobile anterior cervical lymphadenopathy bilat. Trachea is midline.  Respiratory:  Respirations easy and unlabored, Breath sounds equal. Lungs with very faint wheeze; no rhonchi or rales; has good air movement throughout. + non-productive cough noted. Non-dyspneic with ambulation; able to maintain SpO2.  Cardiovascular:  Normal rate, Regular rhythm. Normal S1S2. No m/r/g. No peripheral edema.   Gastrointestinal:  Soft, non-tender, non-distended; no palpable masses or organomegaly. Bowel sounds normoactive.  Musculoskeletal:  Grossly normal; appropriate for age.     Integumentary:  Pink, warm, dry, and intact. No rashes or skin discoloration appreciated. Good skin turgor.  Neurologic:  Alert and oriented, no gross deficits.    Cognition and Speech:  Oriented, Speech clear and coherent.    Psychiatric:  Cooperative, Appropriate mood & affect.       ------------------------------------  Medical Decision Making  LABORATORY or RADIOLOGICAL IMAGING ORDERS/RESULTS:   None    IMPRESSION/PLAN:  Course: Worsening; stable    1. Acute bronchitis, unspecified organism (Primary)  - doxycycline (Adoxa) 100 mg tablet; Take 1 tablet (100 mg) by mouth 2 times a day for  10 days. Take with a full glass of water and do not lie down for at least 30 minutes after.  Dispense: 20 tablet; Refill: 0  - predniSONE (Deltasone) 20 mg tablet; Take 1 tablet (20 mg) by mouth once daily for 3 days. Take with a meal.  Dispense: 3 tablet; Refill: 0    No red flags on exam today. Symptoms consistent with acute bronchitis, but reviewed other potential etiologies. Discussed CXR - patient requesting to defer at this point, but encouraged to follow up closely, especially if symptoms persist/worsen. Due to severity and duration of symptoms, will begin treatment with Doxycycline today; will also start short course of low-dose prednisone. She declines rx for cough suppressant at this point. Instructed to push fluids, rest, and to use appropriate over the counter medications as needed for management of symptoms - plain Mucinex and vaporizer may be helpful. Reviewed instructions for self-isolation and continued monitoring. Reviewed red flags to monitor for, counseled on potential adverse reactions of treatments, expectations for improvement in sxs, and advised to follow-up with primary care provider in 2-3 days if symptoms persist, or to seek care sooner if worsening or if any additional concerns/red flags develop.  Patient agreed with plan of care; questions were encouraged and answered.       ALPHONSO Khan-CNP   Advanced Practice Provider  [facility]

## 2025-04-15 ENCOUNTER — APPOINTMENT (OUTPATIENT)
Dept: ORTHOPEDIC SURGERY | Facility: CLINIC | Age: 35
End: 2025-04-15
Payer: COMMERCIAL

## 2025-04-21 ENCOUNTER — APPOINTMENT (OUTPATIENT)
Age: 35
End: 2025-04-21
Payer: COMMERCIAL

## 2025-05-13 ENCOUNTER — APPOINTMENT (OUTPATIENT)
Dept: ORTHOPEDIC SURGERY | Facility: CLINIC | Age: 35
End: 2025-05-13
Payer: COMMERCIAL

## 2025-05-14 ENCOUNTER — APPOINTMENT (OUTPATIENT)
Dept: ORTHOPEDIC SURGERY | Facility: CLINIC | Age: 35
End: 2025-05-14
Payer: COMMERCIAL

## 2025-05-14 ENCOUNTER — TELEPHONE (OUTPATIENT)
Dept: ORTHOPEDIC SURGERY | Facility: CLINIC | Age: 35
End: 2025-05-14

## 2025-05-14 ENCOUNTER — HOSPITAL ENCOUNTER (OUTPATIENT)
Dept: RADIOLOGY | Facility: EXTERNAL LOCATION | Age: 35
Discharge: HOME | End: 2025-05-14

## 2025-05-14 ENCOUNTER — APPOINTMENT (OUTPATIENT)
Dept: LAB | Facility: HOSPITAL | Age: 35
End: 2025-05-14
Payer: COMMERCIAL

## 2025-05-14 ENCOUNTER — PREP FOR PROCEDURE (OUTPATIENT)
Dept: ORTHOPEDIC SURGERY | Facility: CLINIC | Age: 35
End: 2025-05-14

## 2025-05-14 VITALS
WEIGHT: 218.8 LBS | DIASTOLIC BLOOD PRESSURE: 80 MMHG | BODY MASS INDEX: 42.73 KG/M2 | TEMPERATURE: 97.3 F | OXYGEN SATURATION: 96 % | RESPIRATION RATE: 14 BRPM | HEART RATE: 70 BPM | SYSTOLIC BLOOD PRESSURE: 120 MMHG

## 2025-05-14 DIAGNOSIS — Z01.818 PRE-OP EXAM: ICD-10-CM

## 2025-05-14 DIAGNOSIS — M65.332 TRIGGER MIDDLE FINGER OF LEFT HAND: Primary | ICD-10-CM

## 2025-05-14 LAB
ANION GAP SERPL CALC-SCNC: 14 MMOL/L (ref 10–20)
BUN SERPL-MCNC: 7 MG/DL (ref 6–23)
CALCIUM SERPL-MCNC: 9.2 MG/DL (ref 8.6–10.3)
CHLORIDE SERPL-SCNC: 102 MMOL/L (ref 98–107)
CO2 SERPL-SCNC: 26 MMOL/L (ref 21–32)
CREAT SERPL-MCNC: 0.54 MG/DL (ref 0.5–1.05)
EGFRCR SERPLBLD CKD-EPI 2021: >90 ML/MIN/1.73M*2
ERYTHROCYTE [DISTWIDTH] IN BLOOD BY AUTOMATED COUNT: 13.7 % (ref 11.5–14.5)
GLUCOSE SERPL-MCNC: 70 MG/DL (ref 74–99)
HCT VFR BLD AUTO: 39.6 % (ref 36–46)
HGB BLD-MCNC: 12.4 G/DL (ref 12–16)
MCH RBC QN AUTO: 27.4 PG (ref 26–34)
MCHC RBC AUTO-ENTMCNC: 31.3 G/DL (ref 32–36)
MCV RBC AUTO: 87 FL (ref 80–100)
NRBC BLD-RTO: 0 /100 WBCS (ref 0–0)
PLATELET # BLD AUTO: 366 X10*3/UL (ref 150–450)
POTASSIUM SERPL-SCNC: 4.3 MMOL/L (ref 3.5–5.3)
RBC # BLD AUTO: 4.53 X10*6/UL (ref 4–5.2)
SODIUM SERPL-SCNC: 138 MMOL/L (ref 136–145)
WBC # BLD AUTO: 6 X10*3/UL (ref 4.4–11.3)

## 2025-05-14 PROCEDURE — 99215 OFFICE O/P EST HI 40 MIN: CPT | Performed by: SPECIALIST

## 2025-05-14 PROCEDURE — 80048 BASIC METABOLIC PNL TOTAL CA: CPT

## 2025-05-14 PROCEDURE — 85027 COMPLETE CBC AUTOMATED: CPT

## 2025-05-14 PROCEDURE — 1036F TOBACCO NON-USER: CPT | Performed by: SPECIALIST

## 2025-05-14 RX ORDER — OXYCODONE AND ACETAMINOPHEN 5; 325 MG/1; MG/1
1 TABLET ORAL EVERY 6 HOURS PRN
Qty: 6 TABLET | Refills: 0 | Status: SHIPPED | OUTPATIENT
Start: 2025-05-14 | End: 2025-05-17

## 2025-05-14 ASSESSMENT — PAIN - FUNCTIONAL ASSESSMENT: PAIN_FUNCTIONAL_ASSESSMENT: 0-10

## 2025-05-14 ASSESSMENT — PAIN DESCRIPTION - DESCRIPTORS: DESCRIPTORS: NUMBNESS;PINS AND NEEDLES

## 2025-05-14 ASSESSMENT — PAIN SCALES - GENERAL: PAINLEVEL_OUTOF10: 1

## 2025-05-14 NOTE — H&P
History Of Present Illness  Ethel Su is a 35 y.o. female presenting with right carpal tunnel syndrome.     Past Medical History  She has a past medical history of Anxiety, Arthritis, COVID-19 (2021), Depression, Encounter for  delivery without indication ([facility]), Encounter for routine postpartum follow-up (2022), Encounter for routine postpartum follow-up (2021), Gestational diabetes mellitus in pregnancy, diet controlled ([facility]) (2020), Migraine, unspecified, not intractable, without status migrainosus (2018), Other conditions influencing health status, Other specified pregnancy related conditions, unspecified trimester ([facility]) (2022), and Pain in right wrist (2021).    Surgical History  She has a past surgical history that includes Cholecystectomy ();  section, low transverse (2020); Tonsillectomy; and Tubal ligation (2022).     Social History  She reports that she has never smoked. She has never used smokeless tobacco. She reports that she does not drink alcohol and does not use drugs.    Family History  Family History[1]     Allergies  Patient has no known allergies.    Review of Systems     Physical Exam       Review of Systems:  Constitutional: NEGATIVE: Fever, Chills, Anorexia, Weight Loss, Malaise  Eyes: NEGATIVE: Blurry Vision, Drainage, Diploplia, Redness, Vision Loss/ Change  ENMT: NEGATIVE: Nasal Discharge, Nasal Congestion, Ear Pain, Mouth Pain, Throat Pain  Respiratory: NEGATIVE: Dry Cough, Productive Cough, Hemoptysis, Wheezing, Shortness of Breath  Cardiac: NEGATIVE: Chest Pain, Dyspnea on Exertion, Orthopnea, Palpitations, Syncope  Gastrointestinal: NEGATIVE: Nausea, Vomiting, Diarrhea, Constipation, Abdominal Pain  Genitourinary: NEGATIVE: Discharge, Dysuria, Flank Pain, Frequency, Hematuria  Musculoskeletal: POSITIVE: Decreased ROM, Pain, Stiffness, Weakness;   Neurological: NEGATIVE: Dizziness, Confusion,  Headache, Seizures, Syncope        Physical  Examination:  Constitutional: Well developed, awake/alert/oriented x3, no distress, alert and cooperative  Eyes: PERRL, EOMI, clear sclera  ENMT: mucous membranes moist, no apparent injury, no lesions seen  Head/Neck: Neck supple, no apparent injury, thyroid without obvoius mass or tenderness, No JVD, trachea midline, no bruits  Respiratory/Thorax: Patent airways, CTAB, normal breath sounds with good chest expansion, thorax symmetric  Cardiovascular: Regular, rate and rhythm, no systolic murmur, 2+ equal pulses of the extremities, normal S 1and S 2  Gastrointestinal: Nondistended, soft, non-tender, no rebound tenderness or guarding, no masses palpable, no organomegaly, +BS, no bruits  Musculoskeletal: Decreased sensation in the median nerve distribution.  Positive Durkan and Phalen's test.  No thenar atrophy.  Extremities: normal extremities, no cyanosis edema, contusions, no clubbing  Neurological: alert and oriented x3, intact senses, motor, normal strength  Psychological: Appropriate mood and behavior  Skin: Warm and dry, no lesions, no rashes         Last Recorded Vitals  There were no vitals taken for this visit.    Relevant Results      Scheduled medications  Scheduled Medications[2]  Continuous medications  Continuous Medications[3]  PRN medications  PRN Medications[4]  No results found for this or any previous visit (from the past 24 hours).    Assessment/Plan       Right carpal tunnel syndrome here today for her preoperative testing.    H&P was completed.  Percocet 5 mg 6 tablets were prescribed for postoperative pain.  She will follow-up roughly 1 week after the planned surgery for reevaluation and stitch removal.       I spent 37 minutes in the professional and overall care of this patient.      Dakota Fierro MD         [1]   Family History  Problem Relation Name Age of Onset    COPD Mother Roxann     Hyperlipidemia Father Ethan     Other (CVA) Maternal Grandmother  Dortha     Diabetes Maternal Grandmother Dortha     Hypertension Maternal Grandmother Dortha     Stroke Maternal Grandmother Dortha     Other (non-hodgkins lymphoma) Paternal Grandmother Bubba     Cancer Paternal Grandmother Bubba     Heart attack Paternal Grandfather Ottoniel     Alcohol abuse Paternal Grandfather Ottoniel    [2] [3] [4]

## 2025-05-14 NOTE — ASSESSMENT & PLAN NOTE
Right carpal tunnel syndrome    Plan:  6 of Percocet were prescribed.  Follow-up after the surgery.  History and physical was performed.

## 2025-05-14 NOTE — PROGRESS NOTES
Assessment/Plan   Encounter Diagnoses:  Pre-op exam  No problem-specific Assessment & Plan notes found for this encounter.         Subjective    Patient ID: Ethel Su is a 35 y.o. female.    Chief Complaint: Follow-up of the Right Wrist (H & P)     Last Surgery: No surgery found  Last Surgery Date: No surgery found    HPI       Review of Systems:  Constitutional: NEGATIVE: Fever, Chills, Anorexia, Weight Loss, Malaise  Eyes: NEGATIVE: Blurry Vision, Drainage, Diploplia, Redness, Vision Loss/ Change  ENMT: NEGATIVE: Nasal Discharge, Nasal Congestion, Ear Pain, Mouth Pain, Throat Pain  Respiratory: NEGATIVE: Dry Cough, Productive Cough, Hemoptysis, Wheezing, Shortness of Breath  Cardiac: NEGATIVE: Chest Pain, Dyspnea on Exertion, Orthopnea, Palpitations, Syncope  Gastrointestinal: NEGATIVE: Nausea, Vomiting, Diarrhea, Constipation, Abdominal Pain  Genitourinary: NEGATIVE: Discharge, Dysuria, Flank Pain, Frequency, Hematuria  Musculoskeletal: POSITIVE: Decreased ROM, Pain, Stiffness, Weakness;   Neurological: NEGATIVE: Dizziness, Confusion, Headache, Seizures, Syncope        Physical  Examination:  Constitutional: Well developed, awake/alert/oriented x3, no distress, alert and cooperative  Eyes: PERRL, EOMI, clear sclera  ENMT: mucous membranes moist, no apparent injury, no lesions seen  Head/Neck: Neck supple, no apparent injury, thyroid without obvoius mass or tenderness, No JVD, trachea midline, no bruits  Respiratory/Thorax: Patent airways, CTAB, normal breath sounds with good chest expansion, thorax symmetric  Cardiovascular: Regular, rate and rhythm, no systolic murmur, 2+ equal pulses of the extremities, normal S 1and S 2  Gastrointestinal: Nondistended, soft, non-tender, no rebound tenderness or guarding, no masses palpable, no organomegaly, +BS, no bruits  Musculoskeletal: Decreased sensation in the median nerve distribution.  No thenar atrophy.  Positive Durkan and Phalen's.  Extremities: normal  extremities, no cyanosis edema, contusions, no clubbing  Neurological: alert and oriented x3, intact senses, motor, normal strength  Psychological: Appropriate mood and behavior  Skin: Warm and dry, no lesions, no rashes           OBJECTIVE: ORTHO EXAM      IMAGE RESULTS:  Point of Care Ultrasound  These images are not reportable by radiology and will not be interpreted   by  Radiologists.      ULTRASOUND      Procedures     Orders Placed This Encounter    Point of Care Ultrasound

## 2025-05-14 NOTE — TELEPHONE ENCOUNTER
LM FOR PATIENT LETTING HER KNOW THAT HER SURGERY DATE HAS BEEN CHANGED TO 6/11/25 AND HER POV WILL REMAIN THE SAME DATE. LET PATIENT KNOW THAT IF SHE HAS ANY QUESTIONS SHE CAN CONTACT THE OFFICE

## 2025-05-14 NOTE — H&P (VIEW-ONLY)
History Of Present Illness  Ethel Su is a 35 y.o. female presenting with right carpal tunnel syndrome.     Past Medical History  She has a past medical history of Anxiety, Arthritis, COVID-19 (2021), Depression, Encounter for  delivery without indication ([facility]), Encounter for routine postpartum follow-up (2022), Encounter for routine postpartum follow-up (2021), Gestational diabetes mellitus in pregnancy, diet controlled ([facility]) (2020), Migraine, unspecified, not intractable, without status migrainosus (2018), Other conditions influencing health status, Other specified pregnancy related conditions, unspecified trimester ([facility]) (2022), and Pain in right wrist (2021).    Surgical History  She has a past surgical history that includes Cholecystectomy ();  section, low transverse (2020); Tonsillectomy; and Tubal ligation (2022).     Social History  She reports that she has never smoked. She has never used smokeless tobacco. She reports that she does not drink alcohol and does not use drugs.    Family History  Family History[1]     Allergies  Patient has no known allergies.    Review of Systems     Physical Exam       Review of Systems:  Constitutional: NEGATIVE: Fever, Chills, Anorexia, Weight Loss, Malaise  Eyes: NEGATIVE: Blurry Vision, Drainage, Diploplia, Redness, Vision Loss/ Change  ENMT: NEGATIVE: Nasal Discharge, Nasal Congestion, Ear Pain, Mouth Pain, Throat Pain  Respiratory: NEGATIVE: Dry Cough, Productive Cough, Hemoptysis, Wheezing, Shortness of Breath  Cardiac: NEGATIVE: Chest Pain, Dyspnea on Exertion, Orthopnea, Palpitations, Syncope  Gastrointestinal: NEGATIVE: Nausea, Vomiting, Diarrhea, Constipation, Abdominal Pain  Genitourinary: NEGATIVE: Discharge, Dysuria, Flank Pain, Frequency, Hematuria  Musculoskeletal: POSITIVE: Decreased ROM, Pain, Stiffness, Weakness;   Neurological: NEGATIVE: Dizziness, Confusion,  Headache, Seizures, Syncope        Physical  Examination:  Constitutional: Well developed, awake/alert/oriented x3, no distress, alert and cooperative  Eyes: PERRL, EOMI, clear sclera  ENMT: mucous membranes moist, no apparent injury, no lesions seen  Head/Neck: Neck supple, no apparent injury, thyroid without obvoius mass or tenderness, No JVD, trachea midline, no bruits  Respiratory/Thorax: Patent airways, CTAB, normal breath sounds with good chest expansion, thorax symmetric  Cardiovascular: Regular, rate and rhythm, no systolic murmur, 2+ equal pulses of the extremities, normal S 1and S 2  Gastrointestinal: Nondistended, soft, non-tender, no rebound tenderness or guarding, no masses palpable, no organomegaly, +BS, no bruits  Musculoskeletal: Decreased sensation in the median nerve distribution.  Positive Durkan and Phalen's test.  No thenar atrophy.  Extremities: normal extremities, no cyanosis edema, contusions, no clubbing  Neurological: alert and oriented x3, intact senses, motor, normal strength  Psychological: Appropriate mood and behavior  Skin: Warm and dry, no lesions, no rashes         Last Recorded Vitals  There were no vitals taken for this visit.    Relevant Results      Scheduled medications  Scheduled Medications[2]  Continuous medications  Continuous Medications[3]  PRN medications  PRN Medications[4]  No results found for this or any previous visit (from the past 24 hours).    Assessment/Plan       Right carpal tunnel syndrome here today for her preoperative testing.    H&P was completed.  Percocet 5 mg 6 tablets were prescribed for postoperative pain.  She will follow-up roughly 1 week after the planned surgery for reevaluation and stitch removal.       I spent 37 minutes in the professional and overall care of this patient.      Dakota Fierro MD         [1]   Family History  Problem Relation Name Age of Onset    COPD Mother Roxann     Hyperlipidemia Father Ethan     Other (CVA) Maternal Grandmother  Dortha     Diabetes Maternal Grandmother Dortha     Hypertension Maternal Grandmother Dortha     Stroke Maternal Grandmother Dortha     Other (non-hodgkins lymphoma) Paternal Grandmother Bubba     Cancer Paternal Grandmother Bubba     Heart attack Paternal Grandfather Ottoniel     Alcohol abuse Paternal Grandfather Ottoniel    [2] [3] [4]

## 2025-06-05 NOTE — PREPROCEDURE INSTRUCTIONS
Current Rx Instructions for Day of Surgery[1]                    NPO Instructions:    Do not eat any food after midnight the night before your surgery/procedure.  You may have clear liquids until TWO hours before surgery/procedure. This includes water, black tea/coffee, (no milk or cream) apple juice and electrolyte drinks (Gatorade).    Additional Instructions:     Will need  home, will receive call day before surgery with arrival time                                                     [1]   No outpatient medications have been marked as taking for the 6/11/25 encounter (Hospital Encounter).

## 2025-06-11 ENCOUNTER — HOSPITAL ENCOUNTER (OUTPATIENT)
Facility: HOSPITAL | Age: 35
Setting detail: OUTPATIENT SURGERY
Discharge: HOME | End: 2025-06-11
Attending: SPECIALIST | Admitting: SPECIALIST
Payer: COMMERCIAL

## 2025-06-11 ENCOUNTER — ANESTHESIA (OUTPATIENT)
Dept: OPERATING ROOM | Facility: HOSPITAL | Age: 35
End: 2025-06-11
Payer: COMMERCIAL

## 2025-06-11 ENCOUNTER — ANESTHESIA EVENT (OUTPATIENT)
Dept: OPERATING ROOM | Facility: HOSPITAL | Age: 35
End: 2025-06-11
Payer: COMMERCIAL

## 2025-06-11 VITALS
HEART RATE: 67 BPM | DIASTOLIC BLOOD PRESSURE: 76 MMHG | SYSTOLIC BLOOD PRESSURE: 130 MMHG | RESPIRATION RATE: 15 BRPM | TEMPERATURE: 97 F | OXYGEN SATURATION: 97 % | HEIGHT: 60 IN | BODY MASS INDEX: 42.59 KG/M2 | WEIGHT: 216.93 LBS

## 2025-06-11 DIAGNOSIS — G56.03 BILATERAL CARPAL TUNNEL SYNDROME: ICD-10-CM

## 2025-06-11 PROCEDURE — 3600000003 HC OR TIME - INITIAL BASE CHARGE - PROCEDURE LEVEL THREE: Performed by: SPECIALIST

## 2025-06-11 PROCEDURE — 2500000004 HC RX 250 GENERAL PHARMACY W/ HCPCS (ALT 636 FOR OP/ED): Performed by: ANESTHESIOLOGY

## 2025-06-11 PROCEDURE — L3670 SO ACRO/CLAV CAN WEB PRE OTS: HCPCS | Performed by: SPECIALIST

## 2025-06-11 PROCEDURE — 3700000002 HC GENERAL ANESTHESIA TIME - EACH INCREMENTAL 1 MINUTE: Performed by: SPECIALIST

## 2025-06-11 PROCEDURE — 3700000001 HC GENERAL ANESTHESIA TIME - INITIAL BASE CHARGE: Performed by: SPECIALIST

## 2025-06-11 PROCEDURE — 64721 CARPAL TUNNEL SURGERY: CPT | Performed by: SPECIALIST

## 2025-06-11 PROCEDURE — 2720000007 HC OR 272 NO HCPCS: Performed by: SPECIALIST

## 2025-06-11 PROCEDURE — 3600000008 HC OR TIME - EACH INCREMENTAL 1 MINUTE - PROCEDURE LEVEL THREE: Performed by: SPECIALIST

## 2025-06-11 PROCEDURE — 2500000001 HC RX 250 WO HCPCS SELF ADMINISTERED DRUGS (ALT 637 FOR MEDICARE OP): Performed by: ANESTHESIOLOGY

## 2025-06-11 PROCEDURE — 2740000001 HC OR 274 NO HCPCS: Performed by: SPECIALIST

## 2025-06-11 PROCEDURE — 2500000004 HC RX 250 GENERAL PHARMACY W/ HCPCS (ALT 636 FOR OP/ED): Performed by: SPECIALIST

## 2025-06-11 PROCEDURE — 2500000005 HC RX 250 GENERAL PHARMACY W/O HCPCS: Performed by: ANESTHESIOLOGY

## 2025-06-11 PROCEDURE — 7100000010 HC PHASE TWO TIME - EACH INCREMENTAL 1 MINUTE: Performed by: SPECIALIST

## 2025-06-11 PROCEDURE — 7100000009 HC PHASE TWO TIME - INITIAL BASE CHARGE: Performed by: SPECIALIST

## 2025-06-11 RX ORDER — LABETALOL HYDROCHLORIDE 5 MG/ML
5 INJECTION, SOLUTION INTRAVENOUS ONCE AS NEEDED
Status: DISCONTINUED | OUTPATIENT
Start: 2025-06-11 | End: 2025-06-11 | Stop reason: HOSPADM

## 2025-06-11 RX ORDER — ONDANSETRON HYDROCHLORIDE 2 MG/ML
4 INJECTION, SOLUTION INTRAVENOUS ONCE AS NEEDED
Status: COMPLETED | OUTPATIENT
Start: 2025-06-11 | End: 2025-06-11

## 2025-06-11 RX ORDER — PROPOFOL 10 MG/ML
INJECTION, EMULSION INTRAVENOUS CONTINUOUS PRN
Status: DISCONTINUED | OUTPATIENT
Start: 2025-06-11 | End: 2025-06-11

## 2025-06-11 RX ORDER — OXYCODONE HYDROCHLORIDE 5 MG/1
5 TABLET ORAL EVERY 4 HOURS PRN
Status: DISCONTINUED | OUTPATIENT
Start: 2025-06-11 | End: 2025-06-11 | Stop reason: HOSPADM

## 2025-06-11 RX ORDER — SODIUM CHLORIDE, SODIUM LACTATE, POTASSIUM CHLORIDE, CALCIUM CHLORIDE 600; 310; 30; 20 MG/100ML; MG/100ML; MG/100ML; MG/100ML
20 INJECTION, SOLUTION INTRAVENOUS ONCE
Status: COMPLETED | OUTPATIENT
Start: 2025-06-11 | End: 2025-06-11

## 2025-06-11 RX ORDER — CEFAZOLIN SODIUM 2 G/50ML
2 SOLUTION INTRAVENOUS ONCE
Status: DISCONTINUED | OUTPATIENT
Start: 2025-06-11 | End: 2025-06-11 | Stop reason: HOSPADM

## 2025-06-11 RX ORDER — MORPHINE SULFATE 4 MG/ML
2 INJECTION, SOLUTION INTRAMUSCULAR; INTRAVENOUS EVERY 5 MIN PRN
Status: DISCONTINUED | OUTPATIENT
Start: 2025-06-11 | End: 2025-06-11 | Stop reason: HOSPADM

## 2025-06-11 RX ORDER — SODIUM CHLORIDE, SODIUM LACTATE, POTASSIUM CHLORIDE, CALCIUM CHLORIDE 600; 310; 30; 20 MG/100ML; MG/100ML; MG/100ML; MG/100ML
100 INJECTION, SOLUTION INTRAVENOUS CONTINUOUS
Status: DISCONTINUED | OUTPATIENT
Start: 2025-06-11 | End: 2025-06-11 | Stop reason: HOSPADM

## 2025-06-11 RX ORDER — ACETAMINOPHEN 325 MG/1
975 TABLET ORAL ONCE
Status: COMPLETED | OUTPATIENT
Start: 2025-06-11 | End: 2025-06-11

## 2025-06-11 RX ORDER — LIDOCAINE HYDROCHLORIDE 10 MG/ML
INJECTION, SOLUTION INFILTRATION; PERINEURAL AS NEEDED
Status: DISCONTINUED | OUTPATIENT
Start: 2025-06-11 | End: 2025-06-11 | Stop reason: HOSPADM

## 2025-06-11 RX ORDER — MORPHINE SULFATE 4 MG/ML
4 INJECTION, SOLUTION INTRAMUSCULAR; INTRAVENOUS EVERY 5 MIN PRN
Status: DISCONTINUED | OUTPATIENT
Start: 2025-06-11 | End: 2025-06-11 | Stop reason: HOSPADM

## 2025-06-11 RX ADMIN — SODIUM CHLORIDE, POTASSIUM CHLORIDE, SODIUM LACTATE AND CALCIUM CHLORIDE 100 ML/HR: 600; 310; 30; 20 INJECTION, SOLUTION INTRAVENOUS at 10:01

## 2025-06-11 RX ADMIN — ONDANSETRON 4 MG: 2 INJECTION, SOLUTION INTRAMUSCULAR; INTRAVENOUS at 10:01

## 2025-06-11 RX ADMIN — ACETAMINOPHEN 975 MG: 325 TABLET ORAL at 08:32

## 2025-06-11 RX ADMIN — SODIUM CHLORIDE, POTASSIUM CHLORIDE, SODIUM LACTATE AND CALCIUM CHLORIDE 20 ML/HR: 600; 310; 30; 20 INJECTION, SOLUTION INTRAVENOUS at 08:41

## 2025-06-11 RX ADMIN — Medication 30 MG: at 09:17

## 2025-06-11 RX ADMIN — PROPOFOL 120 MCG/KG/MIN: 10 INJECTION, EMULSION INTRAVENOUS at 09:17

## 2025-06-11 ASSESSMENT — PAIN SCALES - GENERAL
PAINLEVEL_OUTOF10: 0 - NO PAIN

## 2025-06-11 ASSESSMENT — COLUMBIA-SUICIDE SEVERITY RATING SCALE - C-SSRS
1. IN THE PAST MONTH, HAVE YOU WISHED YOU WERE DEAD OR WISHED YOU COULD GO TO SLEEP AND NOT WAKE UP?: NO
2. HAVE YOU ACTUALLY HAD ANY THOUGHTS OF KILLING YOURSELF?: NO
6. HAVE YOU EVER DONE ANYTHING, STARTED TO DO ANYTHING, OR PREPARED TO DO ANYTHING TO END YOUR LIFE?: NO

## 2025-06-11 ASSESSMENT — PAIN - FUNCTIONAL ASSESSMENT
PAIN_FUNCTIONAL_ASSESSMENT: 0-10

## 2025-06-11 NOTE — ANESTHESIA PREPROCEDURE EVALUATION
Patient: Ethel Su    Procedure Information       Date/Time: 06/11/25 1140    Procedure: Decompression Median Nerve with Carpal Tunnel Release (Right: Wrist)    Location: [facility] OR 03 / [facility] OR    Surgeons: Dakota Fierro MD            Relevant Problems   Neuro   (+) Anxiety with depression   (+) Bilateral carpal tunnel syndrome      GI   (+) Chronic GERD      Endocrine   (+) Class 3 severe obesity without serious comorbidity with body mass index (BMI) of 40.0 to 44.9 in adult      Musculoskeletal   (+) Bilateral carpal tunnel syndrome      Skin   (+) Eczema      GYN   (+) Menorrhagia with irregular cycle       Clinical information reviewed:   Tobacco  Allergies  Meds   Med Hx  Surg Hx  OB Status  Fam Hx  Soc   Hx        NPO Detail:  NPO/Void Status  Carbohydrate Drink Given Prior to Surgery? : N  Date of Last Liquid: 06/10/25  Time of Last Liquid: 2300  Date of Last Solid: 06/10/25  Time of Last Solid: 2300  Last Intake Type: Clear fluids  Time of Last Void: 0750         Physical Exam    Airway  Mallampati: II  TM distance: >3 FB  Neck ROM: full  Mouth opening: 3 or more finger widths     Cardiovascular   Rhythm: regular  Rate: normal     Dental    Pulmonary    Abdominal            Anesthesia Plan    History of general anesthesia?: yes  History of complications of general anesthesia?: no    ASA 2     MAC     Anesthetic plan and risks discussed with patient.

## 2025-06-11 NOTE — OP NOTE
Decompression Median Nerve with Carpal Tunnel Release (R) Operative Note     Date: 2025  OR Location: [facility] OR    Name: Ethel Su, : 1990, Age: 35 y.o., MRN: [id number], Sex: female    Diagnosis  Pre-op Diagnosis      * Bilateral carpal tunnel syndrome [G56.03] Post-op Diagnosis     * Bilateral carpal tunnel syndrome [G56.03]     Procedures  Decompression Median Nerve with Carpal Tunnel Release  75011 - [place] NEUROPLASTY &/TRANSPOS MEDIAN NRV CARPAL TUNNE      Surgeons      * Dakota Fierro - Primary    Resident/Fellow/Other Assistant:  Surgeons and Role:  * No surgeons found with a matching role *    Staff:   Scrub Person: Cricket  Circulator: Nandini  Circulator: Kiara    Anesthesia Staff: Anesthesiologist: Valdemar Greene MD    Procedure Summary  Anesthesia: Monitor Anesthesia Care  ASA: II  Estimated Blood Loss: 0 mL  Intra-op Medications: Administrations occurring from 1140 to 1230 on 25:  * No intraprocedure medications in log *        Specimen: No specimens collected              Drains and/or Catheters: * None in log *    Tourniquet Times:   * Missing tourniquet times found for documented tourniquets in lo *     Implants:     Findings: Tight distally    Indications: Ethel Su is an 35 y.o. female who is having surgery for Bilateral carpal tunnel syndrome [G56.03].     The patient was seen in the preoperative area. The risks, benefits, complications, treatment options, non-operative alternatives, expected recovery and outcomes were discussed with the patient. The possibilities of reaction to medication, pulmonary aspiration, injury to surrounding structures, bleeding, recurrent infection, the need for additional procedures, failure to diagnose a condition, and creating a complication requiring transfusion or operation were discussed with the patient. The patient concurred with the proposed plan, giving informed consent.  The site of surgery was properly noted/marked if  necessary per policy. The patient has been actively warmed in preoperative area. Preoperative antibiotics have been ordered and given within 1 hours of incision. Venous thrombosis prophylaxis have been ordered including unilateral sequential compression device    Procedure Details: Orthopedic Operative Note        Antibiotics:   Patient received case appropriate IV antibiotics within 20 minutes of the time of incision. 2 Grams Ancef.    Indications for Surgery: Patient has  a long history of numbness and tingling affecting the median nerve distribution of this  hand. Their clinical exam and other testing is diagnostic for carpal tunnel syndrome.   Informed Consent:   Patient was advised of the risks, benefits, alternatives and complications of a carpal tunnel release.   The patient agreed to proceed. The patient was informed of the risk of poor healing, infection, nerve and blood vessel injury, persistence of numbness and other symptoms and rarely nerve or tendon injury. The patient agreed to proceed.    DESCRIPTION OF PROCEDURE:    The patient was identified and placed supine on the operating room table with a hand table extension. The initials I had placed on the patient's operative site were visible at the time of incision. The operating room staff participated in a surgical time in and agreed that we were ready to proceed with the planned surgical procedure.   A well padded tourniquet was applied to the upper arm. The surgical approach was outlined on the patient's hand with a surgical marker. Newby's Cardinal line distally and the proximal wrist crease proximally were identified. A line coincident with the ulnar border of the third finger was then drawn between these two limits.  This was the surgical incision. It measured approximately 5 centimeters. An Esmarch bandage was used to exsanguinate the upper extremity. The tourniquet was raised to 250 mmHg. Under loupe magnification control the incision was made  with a #15 blade scalpel. This was taken through the skin and subcutaneous fat was spread with a surgical scissors. Hemostasis was obtained with electrocautery or bipolar throughout. The superficial palmar fascia was identified and incised longitudinally coincident with the surgical incision. The transverse carpal ligament was identified. This was incised with the #15 blade scalpel. Once the canal was entered  dissecting scissors were used to continue the transection of the transverse carpal ligament. Standard scissors dissection techniques were utilized. Attention was first turned distally until the transverse carpal ligament was completely incised. This was on the ulnar aspect of the canal so as to protect the recurrent median nerve and superficial radial nerve which can sometimes run in this area.  Fat was noted at the completion of the transverse carpal ligament.  The area of maximal impingement was found to be distal.  Attention was then turned proximally and the remainder of the transverse carpal ligament was incised. The superficial forearm fascia was also incised under direct loupe magnification visualization for about 3 to 5 centimeters proximal to the incision. The median nerve was carefully protected throughout this process. The median nerve was then visualized. An area of compression was noted where are the transverse carpal ligament had impinged upon the nerve. Once released the nerve was seen to be freely moving as the flexor tendons were brought through their normal excursions. No space occupying lesion was noted in the carpal canal. The recurrent median nerve was visualized and found to be intact and released of any compression. The wound was then copiously irrigated. Hemostasis was again achieved with bipolar electrocautery. 4-0 Prolene suture was used to close the skin. Dry sterile compressive dressings were applied. The tourniquet was released. Hemostasis was confirmed. The patient returned to the  recovery room in stable condition without complication.     Tourniquet Time: 9 mins.      Surgeon: Dakota Fierro MD    Evidence of Infection: No   Complications:  None; patient tolerated the procedure well.    Disposition: PACU - hemodynamically stable.  Condition: stable                 Additional Details:     Attending Attestation: I was present and scrubbed for the entire procedure.    Dakota Fierro  Phone Number: [phone]

## 2025-06-11 NOTE — ANESTHESIA POSTPROCEDURE EVALUATION
Patient: Ethel Su    Procedure Summary       Date: 06/11/25 Room / Location: [facility] OR 03 / [facility]    Anesthesia Start: 0917 Anesthesia Stop: 0945    Procedure: Decompression Median Nerve with Carpal Tunnel Release (Right: Wrist) Diagnosis:       Bilateral carpal tunnel syndrome      (Bilateral carpal tunnel syndrome [G56.03])    Surgeons: Dakota Fierro MD Responsible Provider: Valdemar Greene MD    Anesthesia Type: MAC ASA Status: 2            Anesthesia Type: MAC    Vitals Value Taken Time   /77 06/11/25 09:43   Temp 36.1 °C (97 °F) 06/11/25 09:43   Pulse 72 06/11/25 09:43   Resp 14 06/11/25 09:43   SpO2 95 % 06/11/25 09:43       Anesthesia Post Evaluation    Patient location during evaluation: bedside  Patient participation: complete - patient participated  Level of consciousness: sleepy but conscious  Pain management: adequate  Airway patency: patent  Cardiovascular status: acceptable  Respiratory status: acceptable  Hydration status: acceptable  Postoperative Nausea and Vomiting: none        No notable events documented.

## 2025-06-16 ENCOUNTER — TELEPHONE (OUTPATIENT)
Dept: ORTHOPEDIC SURGERY | Facility: CLINIC | Age: 35
End: 2025-06-16
Payer: COMMERCIAL

## 2025-06-16 NOTE — TELEPHONE ENCOUNTER
"Patient called into the office regarding her dressing on her hand/wrist from CTR Sx done on 06/11/25. She states \" the dressing is tight and driving me crazy.\"  She was advised that she can remove the ace wrap and the cotton wrap only from her hand/wrist. She was advised to leave the boarded gauge bandage on the palm of her hand until her POV with our office on 06/18/2025. She verbalized understanding.   "

## 2025-06-18 ENCOUNTER — HOSPITAL ENCOUNTER (OUTPATIENT)
Dept: RADIOLOGY | Facility: EXTERNAL LOCATION | Age: 35
Discharge: HOME | End: 2025-06-18

## 2025-06-18 ENCOUNTER — APPOINTMENT (OUTPATIENT)
Dept: ORTHOPEDIC SURGERY | Facility: CLINIC | Age: 35
End: 2025-06-18
Payer: COMMERCIAL

## 2025-06-18 DIAGNOSIS — Z98.890 H/O CARPAL TUNNEL REPAIR: ICD-10-CM

## 2025-06-18 PROCEDURE — 99024 POSTOP FOLLOW-UP VISIT: CPT | Performed by: SPECIALIST

## 2025-06-18 PROCEDURE — 1036F TOBACCO NON-USER: CPT | Performed by: SPECIALIST

## 2025-06-18 ASSESSMENT — PAIN - FUNCTIONAL ASSESSMENT: PAIN_FUNCTIONAL_ASSESSMENT: NO/DENIES PAIN

## 2025-06-18 NOTE — ASSESSMENT & PLAN NOTE
Assessment: 7 days status post 6/11/2025 carpal tunnel release on the right.  She comes in today stating that she is doing quite well with no numbness or tingling in her night pain has resolved.  She did not use narcotic pain medication postop.    Plan:  Her stitches were removed and Steri-Strips were applied a Band-Aid was placed.  On Sunday she can remove the Band-Aid and shower normally.  She is restricted from soaking the hand.  Encouraged to maintain the motion in her hand and fingers.  Keep it elevated.  She understands the wound may spread slightly when the Steri-Strips are removed.  Follow-up in 3 weeks for reevaluation.  At that visit we can discuss and potentially schedule carpal tunnel release to the left wrist.

## 2025-06-18 NOTE — PROGRESS NOTES
Assessment/Plan   Encounter Diagnoses:  H/O carpal tunnel repair  Bilateral carpal tunnel syndrome  Assessment: 7 days status post 6/11/2025 carpal tunnel release on the right.  She comes in today stating that she is doing quite well with no numbness or tingling in her night pain has resolved.  She did not use narcotic pain medication postop.    Plan:  Her stitches were removed and Steri-Strips were applied a Band-Aid was placed.  On Sunday she can remove the Band-Aid and shower normally.  She is restricted from soaking the hand.  Encouraged to maintain the motion in her hand and fingers.  Keep it elevated.  She understands the wound may spread slightly when the Steri-Strips are removed.  Follow-up in 3 weeks for reevaluation.  At that visit we can discuss and potentially schedule carpal tunnel release to the left wrist.       Subjective    Patient ID: Ethel Su is a 35 y.o. female.    Chief Complaint: Post-op of the Right Wrist (CTR 06/09/25)     Last Surgery: Decompression Median Nerve with Carpal Tunnel Release - Right  Last Surgery Date: 6/11/2025    HPI  35-year-old who is 7 days status post carpal tunnel release on the right.  She has done well not requiring narcotic pain medication.  The night pain and numbness and tingling in her hand has almost completely resolved.    OBJECTIVE: ORTHO EXAM  History of Present Illness  Patient returns today denying any significant pain.  Endorses some relief of pre-op symptoms.  Denies any new neuro deficits. No fever or drainage.     Review of Systems   GENERAL: Negative for malaise, significant weight loss, fever  MUSCULOSKELETAL: see HPI  NEURO:  Negative     Examination  Healthy incision  No erythema or drainage   Sensation intact median, ulnar and radial nerve distribution   + Opposition of thumb  Good cap refill     Assessment:  Patient status post right carpal tunnel release     Plan  Wound is healing nicely.  Discussed wrist splint as needed.  Encouraged ROM  of digits, formal OT if any difficulties.  Follow-up: 3 weeks for re-evaluation.         IMAGE RESULTS:  Point of Care Ultrasound  These images are not reportable by radiology and will not be interpreted   by  Radiologists.      ULTRASOUND  None    Procedures     Orders Placed This Encounter    Point of Care Ultrasound

## 2025-07-02 ENCOUNTER — APPOINTMENT (OUTPATIENT)
Dept: ORTHOPEDIC SURGERY | Facility: CLINIC | Age: 35
End: 2025-07-02
Payer: COMMERCIAL

## 2025-07-02 ENCOUNTER — HOSPITAL ENCOUNTER (OUTPATIENT)
Dept: RADIOLOGY | Facility: EXTERNAL LOCATION | Age: 35
Discharge: HOME | End: 2025-07-02

## 2025-07-02 DIAGNOSIS — Z98.890 H/O CARPAL TUNNEL REPAIR: ICD-10-CM

## 2025-07-02 PROCEDURE — 1036F TOBACCO NON-USER: CPT | Performed by: SPECIALIST

## 2025-07-02 PROCEDURE — 99024 POSTOP FOLLOW-UP VISIT: CPT | Performed by: SPECIALIST

## 2025-07-02 ASSESSMENT — PAIN DESCRIPTION - DESCRIPTORS: DESCRIPTORS: ACHING;DISCOMFORT

## 2025-07-02 ASSESSMENT — PAIN SCALES - GENERAL: PAINLEVEL_OUTOF10: 5 - MODERATE PAIN

## 2025-07-02 ASSESSMENT — PAIN - FUNCTIONAL ASSESSMENT: PAIN_FUNCTIONAL_ASSESSMENT: 0-10

## 2025-07-02 NOTE — ASSESSMENT & PLAN NOTE
Assessment: 3 weeks status post 6/11/2025 carpal tunnel release on the right.  She has some concerns about the more proximal aspect of the incision where she still has some eschar.  She has been wearing a Band-Aid since the surgery.      Plan:    Follow-up in 1 month for reevaluation.  She is permitted to get it wet but should avoid soaking it for at least another week until the eschar has resolved and the wound is sealed.  At her follow-up we will discuss proceeding with her left carpal tunnel release.

## 2025-07-02 NOTE — PROGRESS NOTES
Assessment/Plan   Encounter Diagnoses:  H/O carpal tunnel repair  Bilateral carpal tunnel syndrome  Assessment: 3 weeks status post 6/11/2025 carpal tunnel release on the right.  She has some concerns about the more proximal aspect of the incision where she still has some eschar.  She has been wearing a Band-Aid since the surgery.      Plan:    Follow-up in 1 month for reevaluation.  She is permitted to get it wet but should avoid soaking it for at least another week until the eschar has resolved and the wound is sealed.  At her follow-up we will discuss proceeding with her left carpal tunnel release.       Subjective    Patient ID: Ethel Su is a 35 y.o. female.    Chief Complaint: Post-op and Wound Check of the Right Wrist (CTR 06/09/25)     Last Surgery: Decompression Median Nerve with Carpal Tunnel Release - Right  Last Surgery Date: 6/11/2025    HPI  History of Present Illness  Patient returns today denying any significant pain.  Endorses some relief of pre-op symptoms.  Denies any new neuro deficits. No fever or drainage.     Review of Systems   GENERAL: Negative for malaise, significant weight loss, fever  MUSCULOSKELETAL: see HPI  NEURO:  Negative     Examination  Healthy incision-there is some minimal eschar at the proximal one quarter of the incision.  There is no erythema or drainage or other signs of infection.  She is nontender to palpation about the incision.  She does have some decreased sensation adjacent to the incision.  No erythema or drainage   Sensation intact median, ulnar and radial nerve distribution   + Opposition of thumb  Good cap refill     Assessment:  Patient status post right carpal tunnel release     Plan  Wound is healing nicely.  Discussed wrist splint as needed.  Encouraged ROM of digits, formal OT if any difficulties.  Follow-up: 4 weeks for re-evaluation.             IMAGE RESULTS:  Point of Care Ultrasound  These images are not reportable by radiology and will not be  interpreted   by  Radiologists.      ULTRASOUND  None    Procedures     Orders Placed This Encounter    Point of Care Ultrasound

## 2025-07-10 ENCOUNTER — APPOINTMENT (OUTPATIENT)
Dept: ORTHOPEDIC SURGERY | Facility: CLINIC | Age: 35
End: 2025-07-10
Payer: COMMERCIAL

## 2025-07-25 ENCOUNTER — APPOINTMENT (OUTPATIENT)
Dept: OBSTETRICS AND GYNECOLOGY | Facility: CLINIC | Age: 35
End: 2025-07-25
Payer: COMMERCIAL

## 2025-07-30 ENCOUNTER — PREP FOR PROCEDURE (OUTPATIENT)
Dept: ORTHOPEDIC SURGERY | Facility: CLINIC | Age: 35
End: 2025-07-30

## 2025-07-30 ENCOUNTER — APPOINTMENT (OUTPATIENT)
Dept: ORTHOPEDIC SURGERY | Facility: CLINIC | Age: 35
End: 2025-07-30
Payer: COMMERCIAL

## 2025-07-30 ENCOUNTER — HOSPITAL ENCOUNTER (OUTPATIENT)
Dept: RADIOLOGY | Facility: EXTERNAL LOCATION | Age: 35
Discharge: HOME | End: 2025-07-30

## 2025-07-30 DIAGNOSIS — Z98.890 H/O CARPAL TUNNEL REPAIR: ICD-10-CM

## 2025-07-30 DIAGNOSIS — G56.03 BILATERAL CARPAL TUNNEL SYNDROME: ICD-10-CM

## 2025-07-30 PROCEDURE — 99214 OFFICE O/P EST MOD 30 MIN: CPT | Performed by: SPECIALIST

## 2025-07-30 PROCEDURE — 1036F TOBACCO NON-USER: CPT | Performed by: SPECIALIST

## 2025-07-30 PROCEDURE — 76882 US LMTD JT/FCL EVL NVASC XTR: CPT | Performed by: SPECIALIST

## 2025-07-30 ASSESSMENT — PAIN SCALES - GENERAL: PAINLEVEL_OUTOF10: 0 - NO PAIN

## 2025-07-30 ASSESSMENT — PAIN - FUNCTIONAL ASSESSMENT: PAIN_FUNCTIONAL_ASSESSMENT: 0-10

## 2025-07-30 NOTE — PROGRESS NOTES
Assessment/Plan   Encounter Diagnoses:  H/O carpal tunnel repair  Bilateral carpal tunnel syndrome  Assessment: Left carpal tunnel syndrome  Right wrist 7 weeks status post carpal tunnel release    Plan:  We will schedule the left carpal tunnel release for 11/3/2025 per patient preference.  Continue to do gentle range of motion and strengthening to the right carpal tunnel.  She can use vitamin D E ointment or Lubriderm or similar skin treatment for her scar massage.  She will follow-up in late October when we can perform the history and physical for her upcoming left carpal tunnel release.       Subjective    Patient ID: Ethel Su is a 35 y.o. female.    Chief Complaint: Post-op of the Right Wrist (CTR 06/09/25)     Last Surgery: Decompression Median Nerve with Carpal Tunnel Release - Right  Last Surgery Date: 6/11/2025    HPI  35-year-old who has done quite well with her right carpal tunnel release.  She comes in today for a clinical recheck and mainly to have me assess the left carpal tunnel and recommend treatments.    OBJECTIVE: ORTHO EXAM  History of Present Illness  Patient returns today denying any significant pain.  Endorses some relief of pre-op symptoms.  Denies any new neuro deficits. No fever or drainage.     Review of Systems   GENERAL: Negative for malaise, significant weight loss, fever  MUSCULOSKELETAL: see HPI  NEURO:  Negative     Examination  Healthy incision  No erythema or drainage   Sensation intact median, ulnar and radial nerve distribution   + Opposition of thumb  Good cap refill     Assessment:  Patient status post right carpal tunnel release     Plan  Wound is healing nicely.  Discussed wrist splint as needed.  Encouraged ROM of digits, formal OT if any difficulties.  Follow-up: 2-3 months for re-evaluation.       Left wrist/hand:  No obvious swelling or masses  Thenar eminence muscle mass: Within normal limits  No atrophy noted of hypothenar eminence   Equivocal Tinel's at carpal  tunnel  + Median nerve compression test- Arely's Test  + Phalen's test  Decreased sensation to light touch and 2 point discrimination in median nerve distribution  Motor exam within normal limits  Good capillary refill    IMAGE RESULTS:  Point of Care Ultrasound  These images are not reportable by radiology and will not be interpreted   by  Radiologists.      ULTRASOUND  Wrist Ultrasound    DIAGNOSTIC ULTRASOUND REPORT FINAL: Right HAND AND WRIST  Sonographer: Dakota Fierro MD  Procedure: Ultrasound, extremity, nonvascular, real-time, COMPLETE, anatomic specific.  Indication: Wrist Pain  Technique: B-Mode Ultrasound Examination performed using 8-13 MHz linear transducer with [employer] Software  STUDY TYPE:  1. ULTRASOUND EXTREMITY  2. REAL TIME WITH IMAGE DOCUMENTATION  3. NON-VASCULAR  4. COMPLETE STUDY  Site: LEFT HAND AND WRIST  Live ultrasound was performed with the patient's HAND AND WRIST and PERMANENTLY documented. COMPLETE and FINAL ULTRASOUND REPORT of the patient's  HAND AND WRIST. . I personally performed the ultrasound and reviewed the findings. These show:    Live ultrasound was performed of the patient's HAND AND WRIST that shows intact Extensor digitorum communis, Extensor digiti minimi, Extensor indicis proprius, Extensor Pollicus Longus, Flexor Pollicus Longus, Flexor digitorum profundus, Flexor digitorum superficialis tendon with the THENAR and HYPOTHENAR muscle fibers showing normal striations. NO FLUID NOTED WITHIN THE CARPAL TUNNEL, THE MEDIAN NERVE SHOWS NORMAL PATTERN OF ECHOGENICITY. The median n. remains swollen.  The tendons appear normal in appearance and size as they pass the styloid process. No fracture is appreciated. Nonsterile gloves were used for this procedure  gauze pads were then used to clean the area after the procedure was compete. The patient tolerated the procedure well.  The median nerve is less swollen and no longer flattening as it enters the carpal canal.  It is more  difficult to image than the contralateral side.    Wrist Ultrasound    DIAGNOSTIC ULTRASOUND REPORT FINAL: Left HAND AND WRIST  Sonographer: Dakota Fierro MD  Procedure: Ultrasound, extremity, nonvascular, real-time, COMPLETE, anatomic specific.  Indication: Wrist Pain  Technique: B-Mode Ultrasound Examination performed using 8-13 MHz linear transducer with [employer] Software  STUDY TYPE:  1. ULTRASOUND EXTREMITY  2. REAL TIME WITH IMAGE DOCUMENTATION  3. NON-VASCULAR  4. COMPLETE STUDY  Site: LEFT HAND AND WRIST  Live ultrasound was performed with the patient's HAND AND WRIST and PERMANENTLY documented. COMPLETE and FINAL ULTRASOUND REPORT of the patient's  HAND AND WRIST. . I personally performed the ultrasound and reviewed the findings. These show:    Live ultrasound was performed of the patient's HAND AND WRIST that shows intact Extensor digitorum communis, Extensor digiti minimi, Extensor indicis proprius, Extensor Pollicus Longus, Flexor Pollicus Longus, Flexor digitorum profundus, Flexor digitorum superficialis tendon with the THENAR and HYPOTHENAR muscle fibers showing normal striations. NO FLUID NOTED WITHIN THE CARPAL TUNNEL, THE MEDIAN NERVE SHOWS NORMAL PATTERN OF ECHOGENICITY. The median n. remains swollen.  The tendons appear normal in appearance and size as they pass the styloid process. No fracture is appreciated. Nonsterile gloves were used for this procedure  gauze pads were then used to clean the area after the procedure was compete. The patient tolerated the procedure well.  Continued swelling with an echogenic halo of the median nerve is seen.  The nerve flattens as it enters the carpal canal.    Procedures     Orders Placed This Encounter    Point of Care Ultrasound       The contents of this note were dictated into the dragon software.  Sometimes errors in wording, punctuation, or spelling are seen.

## 2025-07-30 NOTE — ASSESSMENT & PLAN NOTE
Assessment: Left carpal tunnel syndrome  Right wrist 7 weeks status post carpal tunnel release    Plan:  We will schedule the left carpal tunnel release for 11/3/2025 per patient preference.  Continue to do gentle range of motion and strengthening to the right carpal tunnel.  She can use vitamin D E ointment or Lubriderm or similar skin treatment for her scar massage.  She will follow-up in late October when we can perform the history and physical for her upcoming left carpal tunnel release.

## 2025-08-26 ENCOUNTER — APPOINTMENT (OUTPATIENT)
Facility: CLINIC | Age: 35
End: 2025-08-26
Payer: COMMERCIAL

## 2025-08-26 VITALS
DIASTOLIC BLOOD PRESSURE: 74 MMHG | SYSTOLIC BLOOD PRESSURE: 128 MMHG | BODY MASS INDEX: 41.4 KG/M2 | HEIGHT: 60 IN | WEIGHT: 210.9 LBS

## 2025-08-26 DIAGNOSIS — Z01.419 ENCOUNTER FOR BREAST AND PELVIC EXAMINATION: Primary | ICD-10-CM

## 2025-08-26 DIAGNOSIS — N92.1 MENOMETRORRHAGIA: ICD-10-CM

## 2025-08-26 PROCEDURE — 3008F BODY MASS INDEX DOCD: CPT | Performed by: OBSTETRICS & GYNECOLOGY

## 2025-08-26 PROCEDURE — 99459 PELVIC EXAMINATION: CPT | Performed by: OBSTETRICS & GYNECOLOGY

## 2025-08-26 PROCEDURE — 99395 PREV VISIT EST AGE 18-39: CPT | Performed by: OBSTETRICS & GYNECOLOGY

## 2025-08-26 PROCEDURE — 1036F TOBACCO NON-USER: CPT | Performed by: OBSTETRICS & GYNECOLOGY

## 2025-08-26 SDOH — ECONOMIC STABILITY: FOOD INSECURITY: WITHIN THE PAST 12 MONTHS, YOU WORRIED THAT YOUR FOOD WOULD RUN OUT BEFORE YOU GOT MONEY TO BUY MORE.: NEVER TRUE

## 2025-08-26 SDOH — ECONOMIC STABILITY: FOOD INSECURITY: WITHIN THE PAST 12 MONTHS, THE FOOD YOU BOUGHT JUST DIDN'T LAST AND YOU DIDN'T HAVE MONEY TO GET MORE.: NEVER TRUE

## 2025-08-26 SDOH — ECONOMIC STABILITY: TRANSPORTATION INSECURITY
IN THE PAST 12 MONTHS, HAS THE LACK OF TRANSPORTATION KEPT YOU FROM MEDICAL APPOINTMENTS OR FROM GETTING MEDICATIONS?: NO

## 2025-08-26 SDOH — ECONOMIC STABILITY: TRANSPORTATION INSECURITY
IN THE PAST 12 MONTHS, HAS LACK OF TRANSPORTATION KEPT YOU FROM MEETINGS, WORK, OR FROM GETTING THINGS NEEDED FOR DAILY LIVING?: NO

## 2025-08-26 ASSESSMENT — PATIENT HEALTH QUESTIONNAIRE - PHQ9
1. LITTLE INTEREST OR PLEASURE IN DOING THINGS: NOT AT ALL
2. FEELING DOWN, DEPRESSED OR HOPELESS: NOT AT ALL
SUM OF ALL RESPONSES TO PHQ9 QUESTIONS 1 AND 2: 0

## 2025-08-26 ASSESSMENT — LIFESTYLE VARIABLES
HOW MANY STANDARD DRINKS CONTAINING ALCOHOL DO YOU HAVE ON A TYPICAL DAY: PATIENT DOES NOT DRINK
HOW OFTEN DO YOU HAVE A DRINK CONTAINING ALCOHOL: NEVER

## 2025-08-26 ASSESSMENT — PAIN SCALES - GENERAL: PAINLEVEL_OUTOF10: 0-NO PAIN

## 2025-08-28 ENCOUNTER — HOSPITAL ENCOUNTER (OUTPATIENT)
Dept: RADIOLOGY | Facility: HOSPITAL | Age: 35
Discharge: HOME | End: 2025-08-28
Payer: COMMERCIAL

## 2025-08-28 DIAGNOSIS — N92.1 MENOMETRORRHAGIA: ICD-10-CM

## 2025-08-28 PROCEDURE — 76856 US EXAM PELVIC COMPLETE: CPT

## 2025-09-05 ENCOUNTER — APPOINTMENT (OUTPATIENT)
Facility: CLINIC | Age: 35
End: 2025-09-05
Payer: COMMERCIAL

## 2025-09-05 PROBLEM — N92.1 MENOMETRORRHAGIA: Status: ACTIVE | Noted: 2025-09-05

## 2025-09-09 ENCOUNTER — APPOINTMENT (OUTPATIENT)
Age: 35
End: 2025-09-09
Payer: COMMERCIAL

## 2025-10-09 ENCOUNTER — APPOINTMENT (OUTPATIENT)
Dept: ORTHOPEDIC SURGERY | Facility: CLINIC | Age: 35
End: 2025-10-09
Payer: COMMERCIAL

## 2025-10-17 ENCOUNTER — APPOINTMENT (OUTPATIENT)
Facility: CLINIC | Age: 35
End: 2025-10-17
Payer: COMMERCIAL

## (undated) DEVICE — SOLUTION, IRRIGATION, 0.9% SODIUM CHLORIDE, 1000 ML, HANG BOTTLE

## (undated) DEVICE — SYRINGE, 10 ML, 21 G X 1 0.5 IN, LUER LOK

## (undated) DEVICE — IMMOBILIZER, ULTRASLING III, MEDIUM

## (undated) DEVICE — APPLICATOR, CHLORAPREP, W/ORANGE TINT, 26ML

## (undated) DEVICE — DRAPE, SURGICAL HALF, 60 X 44 IN, STERILE

## (undated) DEVICE — STRAP, ARMBOARD, 3IN, BLUE/WHITE, SECURE HOOK

## (undated) DEVICE — GLOVE, PROTEXIS PI CLASSIC, SZ-8.0, PF, PF, LF

## (undated) DEVICE — BANDAGE, ELASTIC, MATRIX, SELF-CLOSURE, 2 IN X 5 YD, LF

## (undated) DEVICE — CIRCUIT, BREATHING, ADULT, B/V FILTER, HMEF, 3 L BAG, 108 IN

## (undated) DEVICE — NEEDLE, ECLIPSE, 25GA X 1-1/2 IN

## (undated) DEVICE — DRESSING, GAUZE, SPONGE, 12 PLY, CURITY, 4 X 4 IN, RIGID TRAY, STERILE

## (undated) DEVICE — STRAP, KNEE AND BODY, SINGLE USE

## (undated) DEVICE — GLOVE, SURGICAL, PROTEXIS PI BLUE W/NEUTHERA, 8.5, PF, LF

## (undated) DEVICE — SUTURE, PROLENE, 4-0, 18 IN, FS2, BLUE

## (undated) DEVICE — PADDING, CAST, SOFTROLL, 2 IN X 4 YD, STERILE

## (undated) DEVICE — DRAPE, SHEET, 17 X 23 IN

## (undated) DEVICE — Device

## (undated) DEVICE — DRESSING, SILVERLON ISLAND, WOUND, 2 X 3IN

## (undated) DEVICE — CUFF, TOURNIQUET, 18 X 4, DUAL PORT/SNGL BLADDER, DISP, LF

## (undated) DEVICE — GOWN, STANDARD, ULTRA, XX-LARGE

## (undated) DEVICE — TOWEL, OR, XRAY DECTECTABLE, 17 X 27, BLUE, 4/PK, STERILE

## (undated) DEVICE — BANDAGE, ESMARK 4 IN X 9 FT, STERILE